# Patient Record
Sex: MALE | Race: WHITE
[De-identification: names, ages, dates, MRNs, and addresses within clinical notes are randomized per-mention and may not be internally consistent; named-entity substitution may affect disease eponyms.]

---

## 2018-12-26 ENCOUNTER — HOSPITAL ENCOUNTER (OUTPATIENT)
Age: 68
End: 2018-12-26
Payer: MEDICARE

## 2018-12-26 DIAGNOSIS — M17.10: ICD-10-CM

## 2018-12-26 DIAGNOSIS — Z01.818: ICD-10-CM

## 2018-12-26 DIAGNOSIS — Z79.01: ICD-10-CM

## 2018-12-26 DIAGNOSIS — T14.8XXA: ICD-10-CM

## 2018-12-26 DIAGNOSIS — R73.9: ICD-10-CM

## 2018-12-26 LAB
ANION GAP SERPL CALC-SCNC: 16.6 MEQ/L (ref 5–15)
APTT PPP: 28.9 SECONDS (ref 23.6–34)
BUN SERPL-MCNC: 20 MG/DL (ref 7–18)
CALCIUM SPEC-MCNC: 8.8 MG/DL (ref 8.5–10.1)
CHLORIDE SPEC-SCNC: 101 MMOL/L (ref 98–107)
CO2 SERPL-SCNC: 25 MMOL/L (ref 21–32)
COLOR UR: YELLOW
CREAT BLD-SCNC: 1.29 MG/DL (ref 0.7–1.3)
ESTIMATED GLOMERULAR FILT RATE: 56 ML/MIN (ref 60–?)
GFR (AFRICAN AMERICAN): 67 ML/MIN (ref 60–?)
GLUCOSE: 114 MG/DL (ref 74–106)
HBA1C MFR BLD: 6.2 % (ref 0–7)
HCT VFR BLD CALC: 46.6 % (ref 42–52)
HGB BLD-MCNC: 15.2 G/DL (ref 14.1–18)
INR PPP: 0.99 (ref 0.9–1.1)
KETONES UR STRIP.AUTO-MCNC: (no result) MG/DL
MCHC RBC-ENTMCNC: 32.7 G/DL (ref 31.8–35.4)
MCV RBC: 90.4 FL (ref 80–94)
MEAN CORPUSCULAR HEMOGLOBIN: 29.6 PG (ref 27–31.2)
MICRO URNS: (no result)
PH UR: 6 [PH] (ref 5–8.5)
PLATELET # BLD: 282 K/MM3 (ref 142–424)
POTASSIUM: 4.6 MMOL/L (ref 3.5–5.1)
PROT UR STRIP-MCNC: (no result) MG/DL
PT BLD: 10.2 SECONDS (ref 9.4–11.8)
RBC # BLD AUTO: 5.15 M/MM3 (ref 4.6–6.2)
SODIUM SPEC-SCNC: 138 MMOL/L (ref 136–145)
SP GR UR: 1.02 (ref 1–1.03)
UROBILINOGEN UR QL: 0.2 EU/DL
WBC # BLD AUTO: 12.5 K/MM3 (ref 4.8–10.8)

## 2018-12-26 PROCEDURE — 85610 PROTHROMBIN TIME: CPT

## 2018-12-26 PROCEDURE — 36415 COLL VENOUS BLD VENIPUNCTURE: CPT

## 2018-12-26 PROCEDURE — 83036 HEMOGLOBIN GLYCOSYLATED A1C: CPT

## 2018-12-26 PROCEDURE — 80048 BASIC METABOLIC PNL TOTAL CA: CPT

## 2018-12-26 PROCEDURE — 71046 X-RAY EXAM CHEST 2 VIEWS: CPT

## 2018-12-26 PROCEDURE — 85730 THROMBOPLASTIN TIME PARTIAL: CPT

## 2018-12-26 PROCEDURE — 81001 URINALYSIS AUTO W/SCOPE: CPT

## 2018-12-26 PROCEDURE — 85025 COMPLETE CBC W/AUTO DIFF WBC: CPT

## 2019-10-16 ENCOUNTER — HOSPITAL ENCOUNTER (OUTPATIENT)
Age: 69
End: 2019-10-16
Payer: MEDICARE

## 2019-10-16 DIAGNOSIS — R73.9: ICD-10-CM

## 2019-10-16 DIAGNOSIS — Z12.5: ICD-10-CM

## 2019-10-16 DIAGNOSIS — I10: Primary | ICD-10-CM

## 2019-10-16 LAB
ALBUMIN LEVEL: 3.9 GM/DL (ref 3.4–5)
ALBUMIN/GLOB SERPL: 1.1 {RATIO} (ref 1.1–1.8)
ALP ISO SERPL-ACNC: 110 U/L (ref 46–116)
ALT SERPLBLD-CCNC: 24 U/L (ref 12–78)
ANION GAP SERPL CALC-SCNC: 16.3 MEQ/L (ref 5–15)
AST SERPL QL: 15 U/L (ref 15–37)
BILIRUBIN,TOTAL: 0.5 MG/DL (ref 0.2–1)
BUN SERPL-MCNC: 23 MG/DL (ref 7–18)
CALCIUM SPEC-MCNC: 8.8 MG/DL (ref 8.5–10.1)
CHLORIDE SPEC-SCNC: 100 MMOL/L (ref 98–107)
CHOLEST SPEC-SCNC: 161 MG/DL (ref 140–200)
CO2 SERPL-SCNC: 24 MMOL/L (ref 21–32)
CREAT BLD-SCNC: 1.01 MG/DL (ref 0.7–1.3)
ESTIMATED GLOMERULAR FILT RATE: 73 ML/MIN (ref 60–?)
GFR (AFRICAN AMERICAN): 89 ML/MIN (ref 60–?)
GLOBULIN SER CALC-MCNC: 3.5 GM/DL (ref 1.3–3.2)
GLUCOSE: 105 MG/DL (ref 74–106)
HCT VFR BLD CALC: 46.1 % (ref 42–52)
HDLC SERPL-MCNC: 32 MG/DL (ref 27–67)
HGB BLD-MCNC: 14.2 G/DL (ref 14.1–18)
MCHC RBC-ENTMCNC: 30.8 G/DL (ref 31.8–35.4)
MCV RBC: 94.2 FL (ref 80–94)
MEAN CORPUSCULAR HEMOGLOBIN: 29 PG (ref 27–31.2)
PLATELET # BLD: 324 K/MM3 (ref 142–424)
POTASSIUM: 4.3 MMOL/L (ref 3.5–5.1)
PROT SERPL-MCNC: 7.4 GM/DL (ref 6.4–8.2)
RBC # BLD AUTO: 4.89 M/MM3 (ref 4.6–6.2)
SODIUM SPEC-SCNC: 136 MMOL/L (ref 136–145)
T4 (THYROXINE): 8 UG/DL (ref 4.7–13.3)
TRIGLYCERIDES: 161 MG/DL (ref 30–200)
TSH SERPL-ACNC: 1.98 UIU/ML (ref 0.36–3.74)
WBC # BLD AUTO: 8.5 K/MM3 (ref 4.8–10.8)

## 2019-10-16 PROCEDURE — 84443 ASSAY THYROID STIM HORMONE: CPT

## 2019-10-16 PROCEDURE — 84436 ASSAY OF TOTAL THYROXINE: CPT

## 2019-10-16 PROCEDURE — 80053 COMPREHEN METABOLIC PANEL: CPT

## 2019-10-16 PROCEDURE — 85025 COMPLETE CBC W/AUTO DIFF WBC: CPT

## 2019-10-16 PROCEDURE — 80061 LIPID PANEL: CPT

## 2019-10-16 PROCEDURE — G0103 PSA SCREENING: HCPCS

## 2020-11-25 ENCOUNTER — HOSPITAL ENCOUNTER (OUTPATIENT)
Age: 70
End: 2020-11-25
Payer: MEDICARE

## 2020-11-25 DIAGNOSIS — R73.9: Primary | ICD-10-CM

## 2020-11-25 DIAGNOSIS — Z12.5: ICD-10-CM

## 2020-11-25 DIAGNOSIS — I50.9: ICD-10-CM

## 2020-11-25 LAB
25-OH VITAMIN D, TOTAL: 35.9 NG/ML (ref 30–100)
ALBUMIN LEVEL: 4.2 G/DL (ref 3.5–5)
ALBUMIN/GLOB SERPL: 1.4 {RATIO} (ref 1.1–1.8)
ALP ISO SERPL-ACNC: 101 U/L (ref 38–126)
ALT SERPLBLD-CCNC: 25 U/L (ref 12–78)
ANION GAP SERPL CALC-SCNC: 15.6 MEQ/L (ref 5–15)
AST SERPL QL: 27 U/L (ref 17–59)
BILIRUBIN,TOTAL: 0.7 MG/DL (ref 0.2–1.3)
BUN SERPL-MCNC: 21 MG/DL (ref 9–20)
CALCIUM SPEC-MCNC: 9.2 MG/DL (ref 8.4–10.2)
CHLORIDE SPEC-SCNC: 100 MMOL/L (ref 98–107)
CHOLEST SPEC-SCNC: 174 MG/DL (ref 140–200)
CO2 SERPL-SCNC: 24 MMOL/L (ref 22–30)
CREAT BLD-SCNC: 1 MG/DL (ref 0.66–1.25)
ESTIMATED GLOMERULAR FILT RATE: 74 ML/MIN (ref 60–?)
GFR (AFRICAN AMERICAN): 90 ML/MIN (ref 60–?)
GLOBULIN SER CALC-MCNC: 2.9 G/DL (ref 1.3–3.2)
GLUCOSE: 157 MG/DL (ref 74–100)
HBA1C MFR BLD: 6.3 % (ref 4–6)
HCT VFR BLD CALC: 45 % (ref 42–52)
HDLC SERPL-MCNC: 28 MG/DL (ref 40–60)
HGB BLD-MCNC: 15.1 G/DL (ref 14.1–18)
MCHC RBC-ENTMCNC: 33.7 G/DL (ref 31.8–35.4)
MCV RBC: 93.7 FL (ref 80–94)
MEAN CORPUSCULAR HEMOGLOBIN: 31.5 PG (ref 27–31.2)
PLATELET # BLD: 274 K/MM3 (ref 142–424)
POTASSIUM: 4.6 MMOL/L (ref 3.5–5.1)
PROT SERPL-MCNC: 7.1 G/DL (ref 6.3–8.2)
RBC # BLD AUTO: 4.8 M/MM3 (ref 4.6–6.2)
SODIUM SPEC-SCNC: 135 MMOL/L (ref 136–145)
T4 FREE SERPL-MCNC: 1.26 NG/DL (ref 0.78–2.19)
TRIGLYCERIDES: 226 MG/DL (ref 30–150)
TSH SERPL-ACNC: 2.39 UIU/ML (ref 0.47–4.68)
WBC # BLD AUTO: 9.3 K/MM3 (ref 4.8–10.8)

## 2020-11-25 PROCEDURE — 82306 VITAMIN D 25 HYDROXY: CPT

## 2020-11-25 PROCEDURE — 84439 ASSAY OF FREE THYROXINE: CPT

## 2020-11-25 PROCEDURE — 80053 COMPREHEN METABOLIC PANEL: CPT

## 2020-11-25 PROCEDURE — 85025 COMPLETE CBC W/AUTO DIFF WBC: CPT

## 2020-11-25 PROCEDURE — 83036 HEMOGLOBIN GLYCOSYLATED A1C: CPT

## 2020-11-25 PROCEDURE — 84443 ASSAY THYROID STIM HORMONE: CPT

## 2020-11-25 PROCEDURE — G0103 PSA SCREENING: HCPCS

## 2020-11-25 PROCEDURE — 80061 LIPID PANEL: CPT

## 2020-12-06 ENCOUNTER — APPOINTMENT (OUTPATIENT)
Dept: PREADMISSION TESTING | Facility: HOSPITAL | Age: 70
End: 2020-12-06

## 2021-01-07 ENCOUNTER — HOSPITAL ENCOUNTER (OUTPATIENT)
Dept: GENERAL RADIOLOGY | Facility: HOSPITAL | Age: 71
Discharge: HOME | End: 2021-01-07

## 2021-01-07 ENCOUNTER — ANESTHESIA EVENT (OUTPATIENT)
Dept: PERIOP | Facility: HOSPITAL | Age: 71
End: 2021-01-07

## 2021-01-07 ENCOUNTER — APPOINTMENT (OUTPATIENT)
Dept: PREADMISSION TESTING | Facility: HOSPITAL | Age: 71
End: 2021-01-07

## 2021-01-07 VITALS — HEIGHT: 70 IN | BODY MASS INDEX: 43.67 KG/M2 | WEIGHT: 305 LBS

## 2021-01-07 LAB
ALBUMIN SERPL-MCNC: 4.4 G/DL (ref 3.5–5.2)
ALBUMIN/GLOB SERPL: 1.5 G/DL
ALP SERPL-CCNC: 105 U/L (ref 39–117)
ALT SERPL W P-5'-P-CCNC: 25 U/L (ref 1–41)
ANION GAP SERPL CALCULATED.3IONS-SCNC: 13 MMOL/L (ref 5–15)
APTT PPP: 33.7 SECONDS (ref 24–37)
AST SERPL-CCNC: 21 U/L (ref 1–40)
BASOPHILS # BLD AUTO: 0.05 10*3/MM3 (ref 0–0.2)
BASOPHILS NFR BLD AUTO: 0.4 % (ref 0–1.5)
BILIRUB SERPL-MCNC: 0.3 MG/DL (ref 0–1.2)
BUN SERPL-MCNC: 21 MG/DL (ref 8–23)
BUN/CREAT SERPL: 22.6 (ref 7–25)
CALCIUM SPEC-SCNC: 9.7 MG/DL (ref 8.6–10.5)
CHLORIDE SERPL-SCNC: 98 MMOL/L (ref 98–107)
CO2 SERPL-SCNC: 24 MMOL/L (ref 22–29)
CREAT SERPL-MCNC: 0.93 MG/DL (ref 0.76–1.27)
DEPRECATED RDW RBC AUTO: 48.5 FL (ref 37–54)
EOSINOPHIL # BLD AUTO: 0.14 10*3/MM3 (ref 0–0.4)
EOSINOPHIL NFR BLD AUTO: 1.3 % (ref 0.3–6.2)
ERYTHROCYTE [DISTWIDTH] IN BLOOD BY AUTOMATED COUNT: 14.3 % (ref 12.3–15.4)
FLUAV RNA RESP QL NAA+PROBE: NOT DETECTED
FLUBV RNA RESP QL NAA+PROBE: NOT DETECTED
GFR SERPL CREATININE-BSD FRML MDRD: 81 ML/MIN/1.73
GFR SERPL CREATININE-BSD FRML MDRD: 98 ML/MIN/1.73
GLOBULIN UR ELPH-MCNC: 2.9 GM/DL
GLUCOSE SERPL-MCNC: 82 MG/DL (ref 65–99)
HBA1C MFR BLD: 6.4 % (ref 4.8–5.6)
HCT VFR BLD AUTO: 44.6 % (ref 37.5–51)
HGB BLD-MCNC: 14.3 G/DL (ref 13–17.7)
IMM GRANULOCYTES # BLD AUTO: 0.03 10*3/MM3 (ref 0–0.05)
IMM GRANULOCYTES NFR BLD AUTO: 0.3 % (ref 0–0.5)
INR PPP: 0.97 (ref 0.85–1.16)
LYMPHOCYTES # BLD AUTO: 2.8 10*3/MM3 (ref 0.7–3.1)
LYMPHOCYTES NFR BLD AUTO: 25.1 % (ref 19.6–45.3)
MCH RBC QN AUTO: 29.4 PG (ref 26.6–33)
MCHC RBC AUTO-ENTMCNC: 32.1 G/DL (ref 31.5–35.7)
MCV RBC AUTO: 91.8 FL (ref 79–97)
MONOCYTES # BLD AUTO: 1.06 10*3/MM3 (ref 0.1–0.9)
MONOCYTES NFR BLD AUTO: 9.5 % (ref 5–12)
NEUTROPHILS NFR BLD AUTO: 63.4 % (ref 42.7–76)
NEUTROPHILS NFR BLD AUTO: 7.07 10*3/MM3 (ref 1.7–7)
NRBC BLD AUTO-RTO: 0 /100 WBC (ref 0–0.2)
PLATELET # BLD AUTO: 293 10*3/MM3 (ref 140–450)
PMV BLD AUTO: 10.6 FL (ref 6–12)
POTASSIUM SERPL-SCNC: 4.4 MMOL/L (ref 3.5–5.2)
PROT SERPL-MCNC: 7.3 G/DL (ref 6–8.5)
PROTHROMBIN TIME: 12.6 SECONDS (ref 11.5–14)
RBC # BLD AUTO: 4.86 10*6/MM3 (ref 4.14–5.8)
SARS-COV-2 RNA RESP QL NAA+PROBE: NOT DETECTED
SODIUM SERPL-SCNC: 135 MMOL/L (ref 136–145)
WBC # BLD AUTO: 11.15 10*3/MM3 (ref 3.4–10.8)

## 2021-01-07 PROCEDURE — 85730 THROMBOPLASTIN TIME PARTIAL: CPT

## 2021-01-07 PROCEDURE — 36415 COLL VENOUS BLD VENIPUNCTURE: CPT

## 2021-01-07 PROCEDURE — 83036 HEMOGLOBIN GLYCOSYLATED A1C: CPT

## 2021-01-07 PROCEDURE — 87636 SARSCOV2 & INF A&B AMP PRB: CPT

## 2021-01-07 PROCEDURE — C9803 HOPD COVID-19 SPEC COLLECT: HCPCS

## 2021-01-07 PROCEDURE — 80053 COMPREHEN METABOLIC PANEL: CPT

## 2021-01-07 PROCEDURE — 85610 PROTHROMBIN TIME: CPT

## 2021-01-07 PROCEDURE — 71046 X-RAY EXAM CHEST 2 VIEWS: CPT

## 2021-01-07 PROCEDURE — 85025 COMPLETE CBC W/AUTO DIFF WBC: CPT

## 2021-01-07 RX ORDER — ATORVASTATIN CALCIUM 20 MG/1
20 TABLET, FILM COATED ORAL NIGHTLY
COMMUNITY

## 2021-01-07 RX ORDER — METOPROLOL SUCCINATE 50 MG/1
50 TABLET, EXTENDED RELEASE ORAL NIGHTLY
COMMUNITY

## 2021-01-07 RX ORDER — SPIRONOLACTONE 25 MG/1
25 TABLET ORAL DAILY
COMMUNITY

## 2021-01-07 RX ORDER — NITROGLYCERIN 0.4 MG/1
0.4 TABLET SUBLINGUAL
COMMUNITY

## 2021-01-07 RX ORDER — BUMETANIDE 2 MG/1
2 TABLET ORAL EVERY MORNING
COMMUNITY

## 2021-01-07 RX ORDER — SACUBITRIL AND VALSARTAN 97; 103 MG/1; MG/1
1 TABLET, FILM COATED ORAL 2 TIMES DAILY
COMMUNITY

## 2021-01-07 RX ORDER — DIPHENHYDRAMINE HCL 50 MG
50 CAPSULE ORAL NIGHTLY PRN
COMMUNITY

## 2021-01-07 RX ORDER — FAMOTIDINE 10 MG/ML
20 INJECTION, SOLUTION INTRAVENOUS ONCE
Status: CANCELLED | OUTPATIENT
Start: 2021-01-07 | End: 2021-01-07

## 2021-01-07 NOTE — PAT
Patient instructed to drink 20 ounces (or until full) of Gatorade and it needs to be completed 1 hour before given arrival time for procedure (NO RED Gatorade)    Patient verbalized understanding.    Patient given a prescription for Benzol Peroxide 5% wash during PAT visit.  Instructed them to fill the prescription or  from [facility] pharmacy if was submitted electronically by the surgeon's office.  Verbal and written instructions given regarding proper use of the Benzoyl Peroxide wash given to patient and/or famlily during PAT visit. Patient/family also instructed to complete checklist and return it to Pre-op on the day of surgery.  Patient and/or family verbalized understanding.      Additionally, reinforced with patient to acquire this prescription from the [facility] retail pharmacy before leaving the hospital after PAT visit due to the potential unavailability at local pharmacies.    Clean catch urinalysis not indicated because patient denied recent urinary frequency, urinary urgency, burning or pain upon urination, or flank pain. No recent UTIs.    covid in [facility]    EKG 12/3/20  Clearance from Dr Onofre on chart     Forgot to obtain UA in PAT.  Patient is asymptomatic.

## 2021-01-08 ENCOUNTER — HOSPITAL ENCOUNTER (OUTPATIENT)
Facility: HOSPITAL | Age: 71
LOS: 1 days | Discharge: HOME | End: 2021-01-09
Attending: ORTHOPAEDIC SURGERY | Admitting: ORTHOPAEDIC SURGERY

## 2021-01-08 ENCOUNTER — ANESTHESIA (OUTPATIENT)
Dept: PERIOP | Facility: HOSPITAL | Age: 71
End: 2021-01-08

## 2021-01-08 ENCOUNTER — APPOINTMENT (OUTPATIENT)
Dept: GENERAL RADIOLOGY | Facility: HOSPITAL | Age: 71
End: 2021-01-08

## 2021-01-08 ENCOUNTER — ANESTHESIA EVENT CONVERTED (OUTPATIENT)
Dept: ANESTHESIOLOGY | Facility: HOSPITAL | Age: 71
End: 2021-01-08

## 2021-01-08 DIAGNOSIS — Z96.611 STATUS POST REVERSE TOTAL ARTHROPLASTY OF RIGHT SHOULDER: Primary | ICD-10-CM

## 2021-01-08 PROBLEM — E78.5 HYPERLIPIDEMIA: Status: ACTIVE | Noted: 2021-01-08

## 2021-01-08 PROBLEM — I10 HTN (HYPERTENSION): Status: ACTIVE | Noted: 2021-01-08

## 2021-01-08 PROBLEM — I25.10 CAD (CORONARY ARTERY DISEASE): Status: ACTIVE | Noted: 2021-01-08

## 2021-01-08 PROBLEM — B33.24 VIRAL CARDIOMYOPATHY (HCC): Status: ACTIVE | Noted: 2021-01-08

## 2021-01-08 PROBLEM — M19.011 GLENOHUMERAL ARTHRITIS, RIGHT: Status: ACTIVE | Noted: 2021-01-08

## 2021-01-08 LAB
BILIRUB UR QL STRIP: NEGATIVE
CLARITY UR: CLEAR
COLOR UR: YELLOW
GLUCOSE UR STRIP-MCNC: NEGATIVE MG/DL
HGB UR QL STRIP.AUTO: NEGATIVE
KETONES UR QL STRIP: NEGATIVE
LEUKOCYTE ESTERASE UR QL STRIP.AUTO: NEGATIVE
NITRITE UR QL STRIP: NEGATIVE
PH UR STRIP.AUTO: 6 [PH] (ref 5–8)
PROT UR QL STRIP: ABNORMAL
SP GR UR STRIP: 1.02 (ref 1–1.03)
UROBILINOGEN UR QL STRIP: ABNORMAL

## 2021-01-08 PROCEDURE — 25010000002 VANCOMYCIN 1 G RECONSTITUTED SOLUTION: Performed by: ORTHOPAEDIC SURGERY

## 2021-01-08 PROCEDURE — 25010000002 DEXAMETHASONE PER 1 MG: Performed by: NURSE ANESTHETIST, CERTIFIED REGISTERED

## 2021-01-08 PROCEDURE — 25010000002 PROPOFOL 10 MG/ML EMULSION: Performed by: NURSE ANESTHETIST, CERTIFIED REGISTERED

## 2021-01-08 PROCEDURE — A9270 NON-COVERED ITEM OR SERVICE: HCPCS | Performed by: ORTHOPAEDIC SURGERY

## 2021-01-08 PROCEDURE — 73020 X-RAY EXAM OF SHOULDER: CPT

## 2021-01-08 PROCEDURE — 25010000002 NEOSTIGMINE 10 MG/10ML SOLUTION: Performed by: NURSE ANESTHETIST, CERTIFIED REGISTERED

## 2021-01-08 PROCEDURE — 94799 UNLISTED PULMONARY SVC/PX: CPT

## 2021-01-08 PROCEDURE — 81003 URINALYSIS AUTO W/O SCOPE: CPT | Performed by: ORTHOPAEDIC SURGERY

## 2021-01-08 PROCEDURE — C1776 JOINT DEVICE (IMPLANTABLE): HCPCS | Performed by: ORTHOPAEDIC SURGERY

## 2021-01-08 PROCEDURE — 25010000002 ROPIVACAINE PER 1 MG: Performed by: NURSE ANESTHETIST, CERTIFIED REGISTERED

## 2021-01-08 PROCEDURE — 25010000002 CEFAZOLIN PER 500 MG: Performed by: ORTHOPAEDIC SURGERY

## 2021-01-08 PROCEDURE — 25010000003 CEFAZOLIN IN DEXTROSE 2-4 GM/100ML-% SOLUTION: Performed by: ORTHOPAEDIC SURGERY

## 2021-01-08 PROCEDURE — 25010000002 FENTANYL CITRATE (PF) 100 MCG/2ML SOLUTION: Performed by: NURSE ANESTHETIST, CERTIFIED REGISTERED

## 2021-01-08 PROCEDURE — 25010000002 ONDANSETRON PER 1 MG: Performed by: NURSE ANESTHETIST, CERTIFIED REGISTERED

## 2021-01-08 PROCEDURE — A9270 NON-COVERED ITEM OR SERVICE: HCPCS | Performed by: INTERNAL MEDICINE

## 2021-01-08 PROCEDURE — 63710000001 ATORVASTATIN 20 MG TABLET: Performed by: INTERNAL MEDICINE

## 2021-01-08 PROCEDURE — 63710000001 POVIDONE-IODINE 10 % SOLUTION 30 ML BOTTLE: Performed by: ORTHOPAEDIC SURGERY

## 2021-01-08 PROCEDURE — 25010000002 PHENYLEPHRINE PER 1 ML: Performed by: NURSE ANESTHETIST, CERTIFIED REGISTERED

## 2021-01-08 PROCEDURE — 25010000003 LIDOCAINE 1 % SOLUTION: Performed by: NURSE ANESTHETIST, CERTIFIED REGISTERED

## 2021-01-08 PROCEDURE — L3670 SO ACRO/CLAV CAN WEB PRE OTS: HCPCS | Performed by: ORTHOPAEDIC SURGERY

## 2021-01-08 DEVICE — 20MM CENTRAL SCREW, MODULAR
Type: IMPLANTABLE DEVICE | Site: SHOULDER | Status: FUNCTIONAL
Brand: ARTHREX®

## 2021-01-08 DEVICE — SCRW LK GLEN UNIVERS REVERS PERIPH 5.5X36MM: Type: IMPLANTABLE DEVICE | Site: SHOULDER | Status: FUNCTIONAL

## 2021-01-08 DEVICE — UNIVERS REVERS SUTURE CUP, 42 (NEUTRAL)
Type: IMPLANTABLE DEVICE | Site: SHOULDER | Status: FUNCTIONAL
Brand: ARTHREX®

## 2021-01-08 DEVICE — SUT FW #2 W/TPR NDL 1/2 CIR 38IN 97CM 26.5MM BLU: Type: IMPLANTABLE DEVICE | Site: SHOULDER | Status: FUNCTIONAL

## 2021-01-08 DEVICE — 4.5X32MM PERIPHERAL SCREW, NON-LOCKING
Type: IMPLANTABLE DEVICE | Site: SHOULDER | Status: FUNCTIONAL
Brand: ARTHREX®

## 2021-01-08 DEVICE — 4.5X40MM PERIPHERAL SCREW, NON-LOCKING
Type: IMPLANTABLE DEVICE | Site: SHOULDER | Status: FUNCTIONAL
Brand: ARTHREX®

## 2021-01-08 DEVICE — UNIVERS REVERS APEX STEM, SIZE 12
Type: IMPLANTABLE DEVICE | Site: SHOULDER | Status: FUNCTIONAL
Brand: ARTHREX®

## 2021-01-08 DEVICE — IMPLANTABLE DEVICE: Type: IMPLANTABLE DEVICE | Site: SHOULDER | Status: FUNCTIONAL

## 2021-01-08 DEVICE — 42 +4 LAT/28 GLENOSPHERE
Type: IMPLANTABLE DEVICE | Site: SHOULDER | Status: FUNCTIONAL
Brand: ARTHREX®

## 2021-01-08 DEVICE — 5.5X16MM PERIPHERAL SCREW, LOCKING
Type: IMPLANTABLE DEVICE | Site: SHOULDER | Status: FUNCTIONAL
Brand: ARTHREX®

## 2021-01-08 DEVICE — HUM INSERT L/42+6 TO FIT IN 42 CUP CONST
Type: IMPLANTABLE DEVICE | Site: SHOULDER | Status: FUNCTIONAL
Brand: ARTHREX®

## 2021-01-08 DEVICE — 28MM +2 LAT BASEPLATE, MODULAR
Type: IMPLANTABLE DEVICE | Site: SHOULDER | Status: FUNCTIONAL
Brand: ARTHREX®

## 2021-01-08 DEVICE — ABSORBABLE HEMOSTAT (OXIDIZED REGENERATED CELLULOSE, U.S.P.)
Type: IMPLANTABLE DEVICE | Site: SHOULDER | Status: FUNCTIONAL
Brand: SURGICEL

## 2021-01-08 RX ORDER — ONDANSETRON 2 MG/ML
4 INJECTION INTRAMUSCULAR; INTRAVENOUS EVERY 6 HOURS PRN
Status: DISCONTINUED | OUTPATIENT
Start: 2021-01-08 | End: 2021-01-09 | Stop reason: HOSPADM

## 2021-01-08 RX ORDER — GLYCOPYRROLATE 0.2 MG/ML
INJECTION INTRAMUSCULAR; INTRAVENOUS AS NEEDED
Status: DISCONTINUED | OUTPATIENT
Start: 2021-01-08 | End: 2021-01-08 | Stop reason: SURG

## 2021-01-08 RX ORDER — OXYCODONE HYDROCHLORIDE 5 MG/1
5 TABLET ORAL EVERY 4 HOURS PRN
Status: DISCONTINUED | OUTPATIENT
Start: 2021-01-08 | End: 2021-01-09 | Stop reason: HOSPADM

## 2021-01-08 RX ORDER — ACETAMINOPHEN 650 MG
TABLET, EXTENDED RELEASE ORAL AS NEEDED
Status: DISCONTINUED | OUTPATIENT
Start: 2021-01-08 | End: 2021-01-08 | Stop reason: HOSPADM

## 2021-01-08 RX ORDER — DEXAMETHASONE SODIUM PHOSPHATE 4 MG/ML
INJECTION, SOLUTION INTRA-ARTICULAR; INTRALESIONAL; INTRAMUSCULAR; INTRAVENOUS; SOFT TISSUE AS NEEDED
Status: DISCONTINUED | OUTPATIENT
Start: 2021-01-08 | End: 2021-01-08 | Stop reason: SURG

## 2021-01-08 RX ORDER — LIDOCAINE HYDROCHLORIDE 10 MG/ML
0.5 INJECTION, SOLUTION EPIDURAL; INFILTRATION; INTRACAUDAL; PERINEURAL ONCE AS NEEDED
Status: COMPLETED | OUTPATIENT
Start: 2021-01-08 | End: 2021-01-08

## 2021-01-08 RX ORDER — PREGABALIN 75 MG/1
75 CAPSULE ORAL ONCE
Status: COMPLETED | OUTPATIENT
Start: 2021-01-08 | End: 2021-01-08

## 2021-01-08 RX ORDER — LABETALOL HYDROCHLORIDE 5 MG/ML
10 INJECTION, SOLUTION INTRAVENOUS EVERY 4 HOURS PRN
Status: DISCONTINUED | OUTPATIENT
Start: 2021-01-08 | End: 2021-01-09 | Stop reason: HOSPADM

## 2021-01-08 RX ORDER — ATORVASTATIN CALCIUM 20 MG/1
20 TABLET, FILM COATED ORAL NIGHTLY
Status: DISCONTINUED | OUTPATIENT
Start: 2021-01-08 | End: 2021-01-09 | Stop reason: HOSPADM

## 2021-01-08 RX ORDER — LIDOCAINE HYDROCHLORIDE 10 MG/ML
INJECTION, SOLUTION INFILTRATION; PERINEURAL AS NEEDED
Status: DISCONTINUED | OUTPATIENT
Start: 2021-01-08 | End: 2021-01-08 | Stop reason: SURG

## 2021-01-08 RX ORDER — CEFAZOLIN SODIUM 2 G/100ML
2 INJECTION, SOLUTION INTRAVENOUS ONCE
Status: COMPLETED | OUTPATIENT
Start: 2021-01-08 | End: 2021-01-08

## 2021-01-08 RX ORDER — ONDANSETRON 2 MG/ML
4 INJECTION INTRAMUSCULAR; INTRAVENOUS ONCE AS NEEDED
Status: DISCONTINUED | OUTPATIENT
Start: 2021-01-08 | End: 2021-01-08 | Stop reason: HOSPADM

## 2021-01-08 RX ORDER — VANCOMYCIN HYDROCHLORIDE 1 G/20ML
INJECTION, POWDER, LYOPHILIZED, FOR SOLUTION INTRAVENOUS AS NEEDED
Status: DISCONTINUED | OUTPATIENT
Start: 2021-01-08 | End: 2021-01-08 | Stop reason: HOSPADM

## 2021-01-08 RX ORDER — FENTANYL CITRATE 50 UG/ML
50 INJECTION, SOLUTION INTRAMUSCULAR; INTRAVENOUS
Status: DISCONTINUED | OUTPATIENT
Start: 2021-01-08 | End: 2021-01-08 | Stop reason: HOSPADM

## 2021-01-08 RX ORDER — HYDROMORPHONE HYDROCHLORIDE 1 MG/ML
0.5 INJECTION, SOLUTION INTRAMUSCULAR; INTRAVENOUS; SUBCUTANEOUS
Status: DISCONTINUED | OUTPATIENT
Start: 2021-01-08 | End: 2021-01-09 | Stop reason: HOSPADM

## 2021-01-08 RX ORDER — CEFAZOLIN SODIUM IN 0.9 % NACL 3 G/100 ML
3 INTRAVENOUS SOLUTION, PIGGYBACK (ML) INTRAVENOUS EVERY 8 HOURS
Status: COMPLETED | OUTPATIENT
Start: 2021-01-08 | End: 2021-01-09

## 2021-01-08 RX ORDER — MAGNESIUM HYDROXIDE 1200 MG/15ML
LIQUID ORAL AS NEEDED
Status: DISCONTINUED | OUTPATIENT
Start: 2021-01-08 | End: 2021-01-08 | Stop reason: HOSPADM

## 2021-01-08 RX ORDER — FENTANYL CITRATE 50 UG/ML
INJECTION, SOLUTION INTRAMUSCULAR; INTRAVENOUS
Status: COMPLETED | OUTPATIENT
Start: 2021-01-08 | End: 2021-01-08

## 2021-01-08 RX ORDER — SODIUM CHLORIDE 450 MG/100ML
50 INJECTION, SOLUTION INTRAVENOUS CONTINUOUS
Status: DISCONTINUED | OUTPATIENT
Start: 2021-01-08 | End: 2021-01-09 | Stop reason: HOSPADM

## 2021-01-08 RX ORDER — METOPROLOL SUCCINATE 50 MG/1
50 TABLET, EXTENDED RELEASE ORAL NIGHTLY
Status: DISCONTINUED | OUTPATIENT
Start: 2021-01-08 | End: 2021-01-09 | Stop reason: HOSPADM

## 2021-01-08 RX ORDER — ACETAMINOPHEN 325 MG/1
650 TABLET ORAL EVERY 4 HOURS PRN
Status: DISCONTINUED | OUTPATIENT
Start: 2021-01-08 | End: 2021-01-09 | Stop reason: HOSPADM

## 2021-01-08 RX ORDER — SODIUM CHLORIDE, SODIUM LACTATE, POTASSIUM CHLORIDE, CALCIUM CHLORIDE 600; 310; 30; 20 MG/100ML; MG/100ML; MG/100ML; MG/100ML
9 INJECTION, SOLUTION INTRAVENOUS CONTINUOUS
Status: DISCONTINUED | OUTPATIENT
Start: 2021-01-08 | End: 2021-01-08

## 2021-01-08 RX ORDER — BUPIVACAINE HYDROCHLORIDE 2.5 MG/ML
INJECTION, SOLUTION EPIDURAL; INFILTRATION; INTRACAUDAL
Status: COMPLETED | OUTPATIENT
Start: 2021-01-08 | End: 2021-01-08

## 2021-01-08 RX ORDER — ACETAMINOPHEN 500 MG
1000 TABLET ORAL ONCE
Status: COMPLETED | OUTPATIENT
Start: 2021-01-08 | End: 2021-01-08

## 2021-01-08 RX ORDER — SODIUM CHLORIDE 0.9 % (FLUSH) 0.9 %
10 SYRINGE (ML) INJECTION AS NEEDED
Status: DISCONTINUED | OUTPATIENT
Start: 2021-01-08 | End: 2021-01-08 | Stop reason: HOSPADM

## 2021-01-08 RX ORDER — ROCURONIUM BROMIDE 10 MG/ML
INJECTION, SOLUTION INTRAVENOUS AS NEEDED
Status: DISCONTINUED | OUTPATIENT
Start: 2021-01-08 | End: 2021-01-08 | Stop reason: SURG

## 2021-01-08 RX ORDER — SODIUM CHLORIDE 0.9 % (FLUSH) 0.9 %
10 SYRINGE (ML) INJECTION EVERY 12 HOURS SCHEDULED
Status: DISCONTINUED | OUTPATIENT
Start: 2021-01-08 | End: 2021-01-08 | Stop reason: HOSPADM

## 2021-01-08 RX ORDER — BUMETANIDE 2 MG/1
2 TABLET ORAL EVERY MORNING
Status: DISCONTINUED | OUTPATIENT
Start: 2021-01-09 | End: 2021-01-09

## 2021-01-08 RX ORDER — ESMOLOL HYDROCHLORIDE 10 MG/ML
INJECTION INTRAVENOUS AS NEEDED
Status: DISCONTINUED | OUTPATIENT
Start: 2021-01-08 | End: 2021-01-08 | Stop reason: SURG

## 2021-01-08 RX ORDER — NEOSTIGMINE METHYLSULFATE 1 MG/ML
INJECTION, SOLUTION INTRAVENOUS AS NEEDED
Status: DISCONTINUED | OUTPATIENT
Start: 2021-01-08 | End: 2021-01-08 | Stop reason: SURG

## 2021-01-08 RX ORDER — PROPOFOL 10 MG/ML
VIAL (ML) INTRAVENOUS AS NEEDED
Status: DISCONTINUED | OUTPATIENT
Start: 2021-01-08 | End: 2021-01-08 | Stop reason: SURG

## 2021-01-08 RX ORDER — ACETAMINOPHEN 650 MG/1
650 SUPPOSITORY RECTAL EVERY 4 HOURS PRN
Status: DISCONTINUED | OUTPATIENT
Start: 2021-01-08 | End: 2021-01-09 | Stop reason: HOSPADM

## 2021-01-08 RX ORDER — FAMOTIDINE 20 MG/1
20 TABLET, FILM COATED ORAL ONCE
Status: COMPLETED | OUTPATIENT
Start: 2021-01-08 | End: 2021-01-08

## 2021-01-08 RX ORDER — SPIRONOLACTONE 25 MG/1
25 TABLET ORAL DAILY
Status: DISCONTINUED | OUTPATIENT
Start: 2021-01-09 | End: 2021-01-09 | Stop reason: HOSPADM

## 2021-01-08 RX ORDER — NALOXONE HCL 0.4 MG/ML
0.1 VIAL (ML) INJECTION
Status: DISCONTINUED | OUTPATIENT
Start: 2021-01-08 | End: 2021-01-09 | Stop reason: HOSPADM

## 2021-01-08 RX ORDER — NITROGLYCERIN 0.4 MG/1
0.4 TABLET SUBLINGUAL
Status: DISCONTINUED | OUTPATIENT
Start: 2021-01-08 | End: 2021-01-09 | Stop reason: HOSPADM

## 2021-01-08 RX ORDER — ONDANSETRON 2 MG/ML
INJECTION INTRAMUSCULAR; INTRAVENOUS AS NEEDED
Status: DISCONTINUED | OUTPATIENT
Start: 2021-01-08 | End: 2021-01-08 | Stop reason: SURG

## 2021-01-08 RX ORDER — ASPIRIN 81 MG/1
81 TABLET ORAL EVERY MORNING
Status: DISCONTINUED | OUTPATIENT
Start: 2021-01-09 | End: 2021-01-09 | Stop reason: HOSPADM

## 2021-01-08 RX ORDER — ONDANSETRON 4 MG/1
4 TABLET, FILM COATED ORAL EVERY 6 HOURS PRN
Status: DISCONTINUED | OUTPATIENT
Start: 2021-01-08 | End: 2021-01-09 | Stop reason: HOSPADM

## 2021-01-08 RX ADMIN — ATORVASTATIN CALCIUM 20 MG: 20 TABLET, FILM COATED ORAL at 22:10

## 2021-01-08 RX ADMIN — FENTANYL CITRATE 100 MCG: 50 INJECTION, SOLUTION INTRAMUSCULAR; INTRAVENOUS at 12:10

## 2021-01-08 RX ADMIN — FAMOTIDINE 20 MG: 20 TABLET, FILM COATED ORAL at 11:28

## 2021-01-08 RX ADMIN — LIDOCAINE HYDROCHLORIDE 50 MG: 10 INJECTION, SOLUTION INFILTRATION; PERINEURAL at 13:36

## 2021-01-08 RX ADMIN — ROPIVACAINE HYDROCHLORIDE 6 ML/HR: 5 INJECTION, SOLUTION EPIDURAL; INFILTRATION; PERINEURAL at 16:37

## 2021-01-08 RX ADMIN — Medication 1000 MG: at 15:12

## 2021-01-08 RX ADMIN — DEXAMETHASONE SODIUM PHOSPHATE 8 MG: 4 INJECTION, SOLUTION INTRAMUSCULAR; INTRAVENOUS at 13:48

## 2021-01-08 RX ADMIN — ROCURONIUM BROMIDE 50 MG: 10 INJECTION INTRAVENOUS at 13:36

## 2021-01-08 RX ADMIN — PROPOFOL 200 MG: 10 INJECTION, EMULSION INTRAVENOUS at 13:36

## 2021-01-08 RX ADMIN — PHENYLEPHRINE HYDROCHLORIDE 100 MCG: 10 INJECTION INTRAVENOUS at 14:42

## 2021-01-08 RX ADMIN — GLYCOPYRROLATE 0.4 MG: 0.4 INJECTION INTRAMUSCULAR; INTRAVENOUS at 16:14

## 2021-01-08 RX ADMIN — NEOSTIGMINE 4 MG: 1 INJECTION INTRAVENOUS at 16:14

## 2021-01-08 RX ADMIN — ONDANSETRON 4 MG: 2 INJECTION INTRAMUSCULAR; INTRAVENOUS at 13:48

## 2021-01-08 RX ADMIN — BUPIVACAINE HYDROCHLORIDE 15 ML: 2.5 INJECTION, SOLUTION EPIDURAL; INFILTRATION; INTRACAUDAL; PERINEURAL at 12:10

## 2021-01-08 RX ADMIN — ACETAMINOPHEN 1000 MG: 500 TABLET ORAL at 11:28

## 2021-01-08 RX ADMIN — ESMOLOL HYDROCHLORIDE 20 MG: 10 INJECTION, SOLUTION INTRAVENOUS at 13:31

## 2021-01-08 RX ADMIN — SODIUM CHLORIDE, POTASSIUM CHLORIDE, SODIUM LACTATE AND CALCIUM CHLORIDE 9 ML/HR: 600; 310; 30; 20 INJECTION, SOLUTION INTRAVENOUS at 11:28

## 2021-01-08 RX ADMIN — CEFAZOLIN SODIUM 3 G: 10 INJECTION, POWDER, FOR SOLUTION INTRAVENOUS at 22:10

## 2021-01-08 RX ADMIN — BUPIVACAINE HYDROCHLORIDE 5 ML: 2.5 INJECTION, SOLUTION EPIDURAL; INFILTRATION; INTRACAUDAL; PERINEURAL at 14:19

## 2021-01-08 RX ADMIN — SODIUM CHLORIDE 50 ML/HR: 4.5 INJECTION, SOLUTION INTRAVENOUS at 18:09

## 2021-01-08 RX ADMIN — PREGABALIN 75 MG: 75 CAPSULE ORAL at 11:28

## 2021-01-08 RX ADMIN — TRANEXAMIC ACID 1000 MG: 100 INJECTION, SOLUTION INTRAVENOUS at 13:46

## 2021-01-08 RX ADMIN — PHENYLEPHRINE HYDROCHLORIDE 100 MCG: 10 INJECTION INTRAVENOUS at 13:52

## 2021-01-08 RX ADMIN — CEFAZOLIN SODIUM 2 G: 2 INJECTION, SOLUTION INTRAVENOUS at 13:34

## 2021-01-08 RX ADMIN — LIDOCAINE HYDROCHLORIDE: 10 INJECTION, SOLUTION EPIDURAL; INFILTRATION; INTRACAUDAL; PERINEURAL at 11:28

## 2021-01-08 RX ADMIN — BUPIVACAINE HYDROCHLORIDE 5 ML: 2.5 INJECTION, SOLUTION EPIDURAL; INFILTRATION; INTRACAUDAL; PERINEURAL at 16:14

## 2021-01-08 RX ADMIN — SODIUM CHLORIDE, POTASSIUM CHLORIDE, SODIUM LACTATE AND CALCIUM CHLORIDE: 600; 310; 30; 20 INJECTION, SOLUTION INTRAVENOUS at 13:31

## 2021-01-08 RX ADMIN — PHENYLEPHRINE HYDROCHLORIDE 100 MCG: 10 INJECTION INTRAVENOUS at 14:38

## 2021-01-08 NOTE — H&P
Pre-Op H&P  Earnest Arellano [place]  [id number]  1950      Chief complaint: Right shoulder pain      Subjective:  Patient is a 70 y.o.male presents for scheduled surgery by Dr. Parekh. He anticipates a right total shoulder arthroplasty today. His shoulder has been painful with limited ROM for many years. He denies use of assistive device for ambulation or recent falls. conservative treatments failed to improve pain or ROM.       Review of Systems:  Constitutional-- No fever, chills or sweats. No fatigue.  CV-- No chest pain, palpitation or syncope. +htn, hld, cad. +cardiac clearance   Resp-- No SOB, cough, hemoptysis  Skin--No rashes or lesions      Allergies:   Allergies   Allergen Reactions   • Bee Venom Swelling         Home Meds:  Medications Prior to Admission   Medication Sig Dispense Refill Last Dose   • ASPIRIN 81 PO Take 81 mg by mouth Every Morning. Did not stop for surgery per cardiologist   1/7/2021 at 2200   • atorvastatin (LIPITOR) 20 MG tablet Take 20 mg by mouth Every Night.   1/7/2021 at 2200   • bumetanide (BUMEX) 2 MG tablet Take 2 mg by mouth Every Morning.   1/7/2021 at 1800   • diphenhydrAMINE (BENADRYL) 50 MG capsule Take 50 mg by mouth At Night As Needed.   1/7/2021 at 2200   • metoprolol succinate XL (TOPROL-XL) 50 MG 24 hr tablet Take 50 mg by mouth Every Night.   1/7/2021 at 2200   • sacubitril-valsartan (Entresto)  MG tablet Take 1 tablet by mouth 2 (Two) Times a Day.   1/8/2021 at 0630   • spironolactone (ALDACTONE) 25 MG tablet Take 25 mg by mouth Daily. Only takes qd   1/8/2021 at 0630   • benzoyl peroxide 5 % external liquid Apply  topically to the appropriate area as directed. 148 g 0    • nitroglycerin (NITROSTAT) 0.4 MG SL tablet Place 0.4 mg under the tongue Every 5 (Five) Minutes As Needed for Chest Pain. 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    result of viral pneumonia; sees Dr Onofre every six months; last seen and cleared for surgery dec 2020     PSH:    Past Surgical History:   Procedure Laterality Date   • CARDIAC CATHETERIZATION  2017    stent inserted x2    • REPLACEMENT TOTAL KNEE Bilateral    • TONSILLECTOMY  1957   • TOTAL ELBOW REPLACEMENT Left 2011       Immunization History:  Influenza: 2020  Pneumococcal: UTD  Tetanus: UTD      Social History:   Tobacco:   Social History     Tobacco Use   Smoking Status Never Smoker   Smokeless Tobacco Never Used      Alcohol:     Social History     Substance and Sexual Activity   Alcohol Use Not Currently    Comment: occasionally          Physical Exam:/82 (BP Location: Right arm, Patient Position: Sitting)   Pulse 91   Temp 97.4 °F (36.3 °C) (Temporal)   Resp 16   Ht 177.8 cm (70\")   Wt (!) 138 kg (305 lb)   SpO2 95%   BMI 43.76 kg/m²       General Appearance:    Alert, cooperative, no distress, appears stated age   Head:    Normocephalic, without obvious abnormality, atraumatic   Lungs:     Clear to auscultation bilaterally, respirations unlabored    Heart:   Regular rate and rhythm, S1 and S2 normal, no murmur, rub    or gallop    Abdomen:    Soft without tenderness. Obese    Breast Exam:    deferred   Genitalia:    deferred   Extremities:   Extremities normal, atraumatic, no cyanosis or edema   Skin:   Skin color, texture, turgor normal, no rashes or lesions   Neurologic:   Grossly intact     Results Review:     LABS:  Lab Results   Component Value Date    WBC 11.15 (H) 01/07/2021    HGB 14.3 01/07/2021    HCT 44.6 01/07/2021    MCV 91.8 01/07/2021     01/07/2021    NEUTROABS 7.07 (H) 01/07/2021    GLUCOSE 82 01/07/2021    BUN 21 01/07/2021    CREATININE 0.93 01/07/2021    EGFRIFNONA " 81 01/07/2021    EGFRIFAFRI 98 01/07/2021     (L) 01/07/2021    K 4.4 01/07/2021    CL 98 01/07/2021    CO2 24.0 01/07/2021    CALCIUM 9.7 01/07/2021    ALBUMIN 4.40 01/07/2021    AST 21 01/07/2021    ALT 25 01/07/2021    BILITOT 0.3 01/07/2021       RADIOLOGY:  Imaging Results (Last 72 Hours)     ** No results found for the last 72 hours. **          I reviewed the patient's new clinical results.    Cancer Staging (if applicable)  Cancer Patient: __ yes __no __unknown; If yes, clinical stage T:__ N:__M:__, stage group or __N/A      Impression: Right shoulder pain      Plan: RIGHT TOTAL SHOULDER ARTHROPLASTY      Leticia Manzanares, MAC   1/8/2021   11:47 EST

## 2021-01-08 NOTE — ANESTHESIA PREPROCEDURE EVALUATION
Anesthesia Evaluation     Patient summary reviewed and Nursing notes reviewed   no history of anesthetic complications:  NPO Solid Status: > 8 hours  NPO Liquid Status: > 2 hours           Airway   Mallampati: III  TM distance: >3 FB  Neck ROM: full  No difficulty expected  Dental - normal exam     Pulmonary - negative pulmonary ROS and normal exam    breath sounds clear to auscultation  Cardiovascular - normal exam    ECG reviewed  Rhythm: regular  Rate: normal    (+) hypertension, CAD, cardiac stents (3 yrs ago)     ROS comment: Cardiology clearance from 12/20 on chart, states improved LV function, low risk for cardiac events.    Neuro/Psych- negative ROS  GI/Hepatic/Renal/Endo    (+) morbid obesity,      Musculoskeletal     Abdominal    Substance History      OB/GYN          Other   arthritis,                      Anesthesia Plan    ASA 3     general with block   (Right interscalene block/catheter with arrow pump for post-operative analgesia per request of Dr. Parekh)  intravenous induction     Anesthetic plan, all risks, benefits, and alternatives have been provided, discussed and informed consent has been obtained with: patient.    Plan discussed with CRNA.

## 2021-01-08 NOTE — OP NOTE
Operative Report Reverse Total Shoulder Arthroplasty    DATE OF OPERATION: 01/08/21    PREOPERATIVE DIAGNOSIS: right shoulder glenohumeral arthropathy      POSTOPERATIVE DIAGNOSES:   right shoulder glenohumeral arthropathy with high grade partial thickness subscapularis and infraspinatus tear     PROCEDURES PERFORMED:  1. right reverse total shoulder arthroplasty.      SURGEON: Trip Parekh MD    ASSISTANTS:  1. Scarlet Sky PA-C  ** Please note the physician assistant was medically necessary to assist with positioning retraction, arm positioning, care of soft tissues and closure      ANESTHESIA: General plus block.      ESTIMATED BLOOD LOSS:150mL.      COMPLICATIONS: None.      DISPOSITION: Recovery room in stable condition.      IMPLANTS:  Arthrex reverse total shoulder system  Humeral Stem: size 12 short  Humeral Tray: centered 42 at 135 degrees  Polyethylene Liner: 42+6 constrained  Baseplate: 28 +2, 20mm central screw  Glenosphere: 42 +4    INDICATIONS: This is a 70-year-old male with right shoulder pain and limited function and motion secondary to above diagnosis. They have failed conservative treatment and after a discussion of risks, benefits, and alternatives, wished to proceed with shoulder arthroplasty.    DESCRIPTION OF PROCEDURE: On the day of surgery, the patient identified the right shoulder as the correct operative extremity. This was initialed by the surgeon with the patients's acknowledgment. The patient underwent placement of an interscalene block and was taken to the operating room and placed in the supine position. Upon induction of adequate anesthesia, the patient was brought up to the beach chair position and the shoulder and upper extremity were prepped and draped in the usual sterile fashion. Timeout confirmed the correct patient and operative extremity as well as that antibiotics were on board. A standard deltopectoral approach to the shoulder was carried out. It was carried sharply  through the skin and subcutaneous tissue. Medial and lateral flaps were developed over the deltopectoral fascia. The cephalic vein was identified and mobilized laterally with the deltoid. The subdeltoid and subpectoral spaces were mobilized and a blunt retractor was placed deep to this. The clavipectoral fascia was opened on the lateral edge of the conjoined tendon and the retractor was moved deep to this. The leading edge of the pectoralis was released exposing the long head of the biceps. This was tenosynovitic and therefore tenotomized. The 3 sisters were identified and coagulated. A subscapularis tenotomy was performed and rotator interval was released to the glenoid exposing the humeral head. The inferior capsule was released directly off the humerus to allow greater than 90° of external rotation. The rotator cuff was inspected and a high grade partial thickness tear of the subscap and infraspinatus was encountered. As such a reverse TSA was elected.  The anatomic neck was exposed and the humeral head osteotomy was performed in approximately 20° of retroversion. The remainder of the osteophytes were removed. The canal was then entered, reamed, and broached. The final stem impacted in in approximately 20° of retroversion. A head protector was placed. The humerus was subluxed posteriorly. The glenoid exposed. Circumferential labral excision and capsular release were performed. A  mobilization of the subscapularis was carried out as well.  A centering hole was drilled. The glenoid was gently reamed and then the  central hole for the baseplate was drilled  glenoid baseplate inserted.  Screws were then placed through the baseplate  The glenosphere was then inserted and locked into place with a set screw.  The humerus was carefully subluxed back anteriorly. A liner tray and polyethylene were placed and trialing was carried out. The appropriate final sizes were chosen and locked into place.  The shoulder was then  reduced.  This allowed nearly full passive range of motion with no instability. The joint was copiously irrigated with orthopedic irrigation mixed with Betadine after the final implants were assembled and locked into place.      vancomycin powder was placed in the wound      The deltopectoral interval was approximated with 0 Vicryl, the subcutaneous tissue with 2-0 Vicryl, and the skin with nylon. A sterile dressing was placed. Anesthesia was reversed and the patient was taken to the recovery room in stable condition. All instrument, needle, and sponge counts were correct.      Trip Parekh MD, MS

## 2021-01-08 NOTE — ANESTHESIA PROCEDURE NOTES
Airway  Urgency: elective    Date/Time: 1/8/2021 1:37 PM  Airway not difficult    General Information and Staff    Patient location during procedure: OR  CRNA: Gay Donovan CRNA    Indications and Patient Condition  Indications for airway management: airway protection    Preoxygenated: yes  MILS not maintained throughout  Mask difficulty assessment: 1 - vent by mask    Final Airway Details  Final airway type: endotracheal airway      Successful airway: ETT  Cuffed: yes   Successful intubation technique: direct laryngoscopy  Facilitating devices/methods: intubating stylet  Endotracheal tube insertion site: oral  Blade: Moody  Blade size: 3  ETT size (mm): 7.0  Cormack-Lehane Classification: grade I - full view of glottis  Placement verified by: chest auscultation and capnometry   Measured from: lips  ETT/EBT  to lips (cm): 20  Number of attempts at approach: 1  Assessment: lips, teeth, and gum same as pre-op and atraumatic intubation    Additional Comments  Negative epigastric sounds, Breath sound equal bilaterally with symmetric chest rise and fall.  Teeth intact, atraumatic

## 2021-01-08 NOTE — ANESTHESIA PROCEDURE NOTES
Peripheral Block      Patient reassessed immediately prior to procedure    Patient location during procedure: pre-op  Reason for block: at surgeon's request and post-op pain management  Performed by  CRNA: Yahir Villalta CRNA  Assisted by: Lotus Loja RN  Preanesthetic Checklist  Completed: patient identified, site marked, surgical consent, pre-op evaluation, timeout performed, IV checked, risks and benefits discussed and monitors and equipment checked  Prep:  Sterile barriers:cap, gloves, mask and sterile barriers  Prep: ChloraPrep  Patient monitoring: blood pressure monitoring, continuous pulse oximetry and EKG  Procedure  Sedation:yes  Performed under: local infiltration  Guidance:ultrasound guided  Images:still images obtained, printed/placed on chart    Laterality:right  Block Type:interscalene  Injection Technique:catheter  Needle Type:Tuohy and echogenic  Needle Gauge:18 G  Resistance on Injection: none  Catheter Size:20 G (20g)  Cath Depth at skin: 10 cm    Medications Used: fentaNYL citrate (PF) (SUBLIMAZE) injection, 100 mcg  bupivacaine PF (MARCAINE) 0.25 % injection, 15 mL  Med admintered at 1/8/2021 12:10 PM      Post Assessment  Injection Assessment: negative aspiration for heme, no paresthesia on injection and incremental injection  Patient Tolerance:comfortable throughout block  Complications:no  Additional Notes  Procedure:                 The pt was placed in semifowlers position with a slight tilt of the thorax contralateral to the insertion site.  The Insertion Site was prepped and draped in sterile fashion.  The pt was anesthetized with  IV Sedation( see meds) and  Skin and cutaneous tissue was infiltrated and anesthetized with 1% Lidocaine 3 mls via a 25g needle.  Utilizing ultrasound guidance, a BBraun 2 inch 18 g Contiplex echogenic touhy needle was advanced in-plane.  Hydro dissection of tissue was achieved with Normal saline. Major vessels(carotid and Internal Jugular) where  visualized as the brachial plexus was approached at the approximate level of C-7/ T-1.  Cervical 5 and Branches of Cervical 6 nerve roots where visualized and the needle tip was placed posterior at the level of C-6 roots.  LA spread was visualized and injection was made incrementally every 5 mls with aspiration. Injection pressure was normal or little, there was no intraneural injection, no vascular injection.      The BBraun 20 g wire stylet  catheter was then placed under US guidance on the posterior aspect of the Brachial Plexus.  Location of catheter was confirmed with NS injection visualized with US . The tuohy was then removed and the skin was sealed with Skin AFix at catheter insertion site.  Skin was prepped with mastisol and the labeled catheter  was secured with steristrips and a CHG tegaderm. Thank You.

## 2021-01-08 NOTE — ANESTHESIA POSTPROCEDURE EVALUATION
Patient: Earnest Abarca    Procedure Summary     Date: 01/08/21 Room / [facility]:  [facility]    Anesthesia Start: 1331 Anesthesia Stop: 1629    Procedure: RIGHT REVERSE TOTAL SHOULDER ARTHROPLASTY (Right Shoulder) Diagnosis:       Glenohumeral arthritis, right      (R shoulder glneohumeral arthritis)    Surgeon: Trip Parekh MD Provider: Randall Carmona MD    Anesthesia Type: general with block ASA Status: 3          Anesthesia Type: general with block    Vitals  Vitals Value Taken Time   BP     Temp     Pulse 57 01/08/21 1628   Resp     SpO2 90 % 01/08/21 1628   Vitals shown include unvalidated device data.        Post Anesthesia Care and Evaluation    Patient location during evaluation: PACU  Patient participation: complete - patient participated  Level of consciousness: awake and alert  Pain management: adequate  Airway patency: patent  Anesthetic complications: No anesthetic complications  PONV Status: none  Cardiovascular status: hemodynamically stable and acceptable  Respiratory status: nonlabored ventilation, acceptable and nasal cannula  Hydration status: acceptable

## 2021-01-08 NOTE — H&P
Patient Name: Earnest Abarca  MRN: [id number]  : 1950  DOS: 2021    Attending: Trip Parekh MD    Primary Care Provider: Verena Boucher PA      Chief complaint: Shoulder pain    Subjective   Patient is a pleasant 70 y.o. male presented for scheduled surgery by Dr. Parekh.  Patient has had right shoulder pain and limited mobility and function due to arthropathy and rotator cuff tear.  He failed conservative management and opted to proceed with surgery.  I saw him preoperatively.  Doing fairly well.  No complains of nausea, vomiting, or shortness of breath.  He has history of viral cardiomyopathy for which she has been followed by Dr. Steiner.  He is usually seen every 6 months and his last echo has been reported with an EF of at least 50%.  Patient has no orthopnea no PND and no lower extremity swelling.  No complains of chest pain and no angina equivalent.    (Chart review shows patient to have undergone right reverse total shoulder arthroplasty later under general anesthesia and a block and was admitted subsequently for further management.)    Allergies   Allergen Reactions   • Bee Venom Swelling       Meds:  Medications Prior to Admission   Medication Sig Dispense Refill Last Dose   • ASPIRIN 81 PO Take 81 mg by mouth Every Morning. Did not stop for surgery per cardiologist   2021 at 2200   • atorvastatin (LIPITOR) 20 MG tablet Take 20 mg by mouth Every Night.   2021 at 2200   • bumetanide (BUMEX) 2 MG tablet Take 2 mg by mouth Every Morning.   2021 at 1800   • diphenhydrAMINE (BENADRYL) 50 MG capsule Take 50 mg by mouth At Night As Needed.   2021 at 2200   • metoprolol succinate XL (TOPROL-XL) 50 MG 24 hr tablet Take 50 mg by mouth Every Night.   2021 at 2200   • sacubitril-valsartan (Entresto)  MG tablet Take 1 tablet by mouth 2 (Two) Times a Day.   2021 at 0630   • spironolactone (ALDACTONE) 25 MG tablet Take 25 mg by mouth Daily. Only takes qd   2021 at  "0630   • benzoyl peroxide 5 % external liquid Apply  topically to the appropriate area as directed. 148 g 0    • nitroglycerin (NITROSTAT) 0.4 MG SL tablet Place 0.4 mg under the tongue Every 5 (Five) Minutes As Needed for Chest Pain. 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 No pertinent family history.  Social History     Tobacco Use   • Smoking status: Never Smoker   • Smokeless tobacco: Never Used   Substance Use Topics   • Alcohol use: Not Currently     Comment: occasionally    • Drug use: Not Currently   .  Semiretired from construction work.    Review of Systems  Pertinent items are noted in HPI, all other systems reviewed and negative    Vital Signs  /71   Pulse 81   Temp 97.6 °F (36.4 °C)   Resp 16   Ht 177.8 cm (70\")   Wt (!) 138 kg (305 lb)   SpO2 93%   BMI 43.76 kg/m²     Physical Exam:    General Appearance:    Alert, cooperative, in no acute distress   Head:    Normocephalic, without obvious abnormality, atraumatic   Eyes:            Lids and lashes normal, conjunctivae and sclerae normal, no   icterus, no pallor, corneas clear,    Ears:    Ears appear intact with no abnormalities noted   Throat:   No oral lesions, no thrush, oral mucosa moist   Neck:   No adenopathy, supple, trachea midline, no thyromegaly         Lungs:   "   Clear to auscultation,respirations regular, even and                   unlabored    Heart:    Regular rhythm and normal rate, normal S1 and S2, no            murmur, no gallop   Abdomen:     Normal bowel sounds, no masses, no organomegaly, soft        non-tender, non-distended, no guarding, no rebound                 tenderness.  Obese   Genitalia:    Deferred   Extremities:   Moves all extremities well, no edema, no cyanosis, no              redness   Pulses:   Pulses palpable and equal bilaterally   Skin:   No bleeding, bruising or rash   Neurologic:   Cranial nerves 2 - 12 grossly intact,       I reviewed the patient's new clinical results.       Results from last 7 days   Lab Units 01/07/21  1603   WBC 10*3/mm3 11.15*   HEMOGLOBIN g/dL 14.3   HEMATOCRIT % 44.6   PLATELETS 10*3/mm3 293     Results from last 7 days   Lab Units 01/07/21  1603   SODIUM mmol/L 135*   POTASSIUM mmol/L 4.4   CHLORIDE mmol/L 98   CO2 mmol/L 24.0   BUN mg/dL 21   CREATININE mg/dL 0.93   CALCIUM mg/dL 9.7   BILIRUBIN mg/dL 0.3   ALK PHOS U/L 105   ALT (SGPT) U/L 25   AST (SGOT) U/L 21   GLUCOSE mg/dL 82     Lab Results   Component Value Date    HGBA1C 6.40 (H) 01/07/2021     Assessment and Plan:       Glenohumeral arthritis, right    HTN (hypertension)    Hyperlipidemia    CAD (coronary artery disease)    Viral cardiomyopathy (CMS/HCC)    Status post reverse total arthroplasty of right shoulder      Plan  Postop management to include  1. PT/OT  2. Pain control-prns, interscalene nerve block catheter   3. IS-encourage  4. DVT proph- mechanicals  5. Bowel regimen  6. Resume home medications as appropriate  7. Monitor post-op labs  8. DC planning for home    Cardiac medications resumed as appropriate.  Statin resumed for dyslipidemia.    Resuming aspirin and beta-blocker and statin for patient's known coronary artery disease.  Patient will be advised to follow-up with primary care provider regarding elevated hemoglobin A1c in prediabetic  rubi.      Dragon disclaimer:  Part of this encounter note is an electronic transcription/translation of spoken language to printed text. The electronic translation of spoken language may permit erroneous, or at times, nonsensical words or phrases to be inadvertently transcribed; Although I have reviewed the note for such errors, some may still exist.    Sowmya Dawson MD  01/08/21  17:13 EST

## 2021-01-09 VITALS
HEIGHT: 70 IN | WEIGHT: 305 LBS | HEART RATE: 87 BPM | OXYGEN SATURATION: 91 % | TEMPERATURE: 98.1 F | RESPIRATION RATE: 18 BRPM | SYSTOLIC BLOOD PRESSURE: 125 MMHG | BODY MASS INDEX: 43.67 KG/M2 | DIASTOLIC BLOOD PRESSURE: 78 MMHG

## 2021-01-09 PROBLEM — G89.18 POSTOPERATIVE PAIN: Status: ACTIVE | Noted: 2021-01-09

## 2021-01-09 LAB
ANION GAP SERPL CALCULATED.3IONS-SCNC: 12 MMOL/L (ref 5–15)
BASOPHILS # BLD AUTO: 0.03 10*3/MM3 (ref 0–0.2)
BASOPHILS NFR BLD AUTO: 0.2 % (ref 0–1.5)
BUN SERPL-MCNC: 17 MG/DL (ref 8–23)
BUN/CREAT SERPL: 16.7 (ref 7–25)
CALCIUM SPEC-SCNC: 8.8 MG/DL (ref 8.6–10.5)
CHLORIDE SERPL-SCNC: 102 MMOL/L (ref 98–107)
CO2 SERPL-SCNC: 20 MMOL/L (ref 22–29)
CREAT SERPL-MCNC: 1.02 MG/DL (ref 0.76–1.27)
DEPRECATED RDW RBC AUTO: 53.4 FL (ref 37–54)
EOSINOPHIL # BLD AUTO: 0 10*3/MM3 (ref 0–0.4)
EOSINOPHIL NFR BLD AUTO: 0 % (ref 0.3–6.2)
ERYTHROCYTE [DISTWIDTH] IN BLOOD BY AUTOMATED COUNT: 14.8 % (ref 12.3–15.4)
GFR SERPL CREATININE-BSD FRML MDRD: 72 ML/MIN/1.73
GFR SERPL CREATININE-BSD FRML MDRD: 88 ML/MIN/1.73
GLUCOSE SERPL-MCNC: 131 MG/DL (ref 65–99)
HCT VFR BLD AUTO: 46.1 % (ref 37.5–51)
HGB BLD-MCNC: 13.8 G/DL (ref 13–17.7)
IMM GRANULOCYTES # BLD AUTO: 0.07 10*3/MM3 (ref 0–0.05)
IMM GRANULOCYTES NFR BLD AUTO: 0.4 % (ref 0–0.5)
LYMPHOCYTES # BLD AUTO: 1.65 10*3/MM3 (ref 0.7–3.1)
LYMPHOCYTES NFR BLD AUTO: 9.9 % (ref 19.6–45.3)
MCH RBC QN AUTO: 29.6 PG (ref 26.6–33)
MCHC RBC AUTO-ENTMCNC: 29.9 G/DL (ref 31.5–35.7)
MCV RBC AUTO: 98.9 FL (ref 79–97)
MONOCYTES # BLD AUTO: 1.29 10*3/MM3 (ref 0.1–0.9)
MONOCYTES NFR BLD AUTO: 7.7 % (ref 5–12)
NEUTROPHILS NFR BLD AUTO: 13.64 10*3/MM3 (ref 1.7–7)
NEUTROPHILS NFR BLD AUTO: 81.8 % (ref 42.7–76)
NRBC BLD AUTO-RTO: 0 /100 WBC (ref 0–0.2)
PLATELET # BLD AUTO: 249 10*3/MM3 (ref 140–450)
PMV BLD AUTO: 10.3 FL (ref 6–12)
POTASSIUM SERPL-SCNC: 4.5 MMOL/L (ref 3.5–5.2)
RBC # BLD AUTO: 4.66 10*6/MM3 (ref 4.14–5.8)
SODIUM SERPL-SCNC: 134 MMOL/L (ref 136–145)
WBC # BLD AUTO: 16.68 10*3/MM3 (ref 3.4–10.8)

## 2021-01-09 PROCEDURE — 63710000001 SPIRONOLACTONE 25 MG TABLET: Performed by: INTERNAL MEDICINE

## 2021-01-09 PROCEDURE — 25010000002 CEFAZOLIN PER 500 MG: Performed by: ORTHOPAEDIC SURGERY

## 2021-01-09 PROCEDURE — 97165 OT EVAL LOW COMPLEX 30 MIN: CPT

## 2021-01-09 PROCEDURE — 97535 SELF CARE MNGMENT TRAINING: CPT

## 2021-01-09 PROCEDURE — A9270 NON-COVERED ITEM OR SERVICE: HCPCS | Performed by: INTERNAL MEDICINE

## 2021-01-09 PROCEDURE — 97110 THERAPEUTIC EXERCISES: CPT

## 2021-01-09 PROCEDURE — 63710000001 SACUBITRIL-VALSARTAN 97-103 MG TABLET: Performed by: INTERNAL MEDICINE

## 2021-01-09 PROCEDURE — 63710000001 METOPROLOL SUCCINATE XL 50 MG TABLET SUSTAINED-RELEASE 24 HOUR: Performed by: INTERNAL MEDICINE

## 2021-01-09 PROCEDURE — 63710000001 OXYCODONE 5 MG TABLET: Performed by: ORTHOPAEDIC SURGERY

## 2021-01-09 PROCEDURE — 97530 THERAPEUTIC ACTIVITIES: CPT

## 2021-01-09 PROCEDURE — 63710000001 ASPIRIN 81 MG TABLET DELAYED-RELEASE: Performed by: INTERNAL MEDICINE

## 2021-01-09 PROCEDURE — A9270 NON-COVERED ITEM OR SERVICE: HCPCS | Performed by: ORTHOPAEDIC SURGERY

## 2021-01-09 PROCEDURE — 80048 BASIC METABOLIC PNL TOTAL CA: CPT | Performed by: ORTHOPAEDIC SURGERY

## 2021-01-09 PROCEDURE — 85025 COMPLETE CBC W/AUTO DIFF WBC: CPT | Performed by: ORTHOPAEDIC SURGERY

## 2021-01-09 RX ORDER — BUMETANIDE 2 MG/1
2 TABLET ORAL EVERY EVENING
Status: DISCONTINUED | OUTPATIENT
Start: 2021-01-09 | End: 2021-01-09 | Stop reason: HOSPADM

## 2021-01-09 RX ORDER — OXYCODONE HYDROCHLORIDE 5 MG/1
5 TABLET ORAL EVERY 4 HOURS PRN
Qty: 40 TABLET | Refills: 0 | Status: SHIPPED | OUTPATIENT
Start: 2021-01-09

## 2021-01-09 RX ADMIN — OXYCODONE 5 MG: 5 TABLET ORAL at 12:16

## 2021-01-09 RX ADMIN — METOPROLOL SUCCINATE 50 MG: 50 TABLET, EXTENDED RELEASE ORAL at 00:17

## 2021-01-09 RX ADMIN — OXYCODONE HYDROCHLORIDE 5 MG: 5 TABLET ORAL at 08:01

## 2021-01-09 RX ADMIN — SACUBITRIL AND VALSARTAN 1 TABLET: 97; 103 TABLET, FILM COATED ORAL at 00:19

## 2021-01-09 RX ADMIN — ASPIRIN 81 MG: 81 TABLET, FILM COATED ORAL at 08:01

## 2021-01-09 RX ADMIN — SPIRONOLACTONE 25 MG: 25 TABLET ORAL at 08:01

## 2021-01-09 RX ADMIN — CEFAZOLIN SODIUM 3 G: 10 INJECTION, POWDER, FOR SOLUTION INTRAVENOUS at 05:04

## 2021-01-09 RX ADMIN — SACUBITRIL AND VALSARTAN 1 TABLET: 97; 103 TABLET, FILM COATED ORAL at 08:05

## 2021-01-09 NOTE — PLAN OF CARE
Goal Outcome Evaluation:  Plan of Care Reviewed With: patient  Progress: improving   Pt is alert & oriented x4. On 4L NC with O2 sats running 91-92%. Pt has had no c/o of pain this shift and is using his ice packs. Ropivicaine infusing at 6mL/hr. Pt has his sling on and his aquacel is clean, dry, and intact. Pt is voiding spontaneously using urinal. Will continue to monitor.

## 2021-01-09 NOTE — PROGRESS NOTES
[facility]    Acute pain service Inpatient Progress Note    Patient Name: Earnest Abarca  :  1950  MRN:  [id number]        Acute Pain  Service Inpatient Progress Note:    Analgesia:Good  Pain Score:4/10  LOC: alert and awake  Resp Status: room air  Cardiac: VS stable  Side Effects:None  Catheter Site:clean, dressing intact and dry  Cath type: peripheral nerve cath with ON Q  Infusion rate: 6ml/hr  Dosing/Volume: ropivacaine 0.2%  Catheter Plan:Catheter to remain Insitu and Continue catheter infusion rate unchanged  Comments: ---------------------------------------------------------------------------------  Physical Therapy Manual Muscle Testing Results:   ]    Physical Therapy - Plan of Care Review - Outcome Summary:   ]    Occupational Therapy - Plan of Care Review - Outcome Summary:   ]  ----------------------------------------------------------------------------------

## 2021-01-09 NOTE — PROGRESS NOTES
Orthopedic Daily Progress Note      CC: s/p reverse tsa    Pain well controlled  General: no fevers, chills  Abdomen: no nausea, vomiting, or diarrhea    No other complaints  Int Hx:  Requiring 02 overnight and this AM    Physical Exam:  I have reviewed the vital signs.  Temp:  [97 °F (36.1 °C)-98.4 °F (36.9 °C)] 98.4 °F (36.9 °C)  Heart Rate:  [] 98  Resp:  [16-20] 20  BP: ()/(56-93) 156/93    Objective  General Appearance:    Alert, cooperative, no distress  Extremities: No clubbing, cyanosis, or edema to lower extremities  Pulses:  2+ in distal surgical extremity  Skin: Dressing Clean/dry/intact      Results Review:    I have reviewed the labs, radiology results and diagnostic studies:    Results from last 7 days   Lab Units 01/09/21  0658   WBC 10*3/mm3 16.68*   HEMOGLOBIN g/dL 13.8   PLATELETS 10*3/mm3 249     Results from last 7 days   Lab Units 01/09/21  0658   SODIUM mmol/L 134*   POTASSIUM mmol/L 4.5   CO2 mmol/L 20.0*   CREATININE mg/dL 1.02   GLUCOSE mg/dL 131*       I have reviewed the medications.    Assessment/Problem List  POD# 1 Day Post-Op   S/p reverse TSA    Plan  1) sling- elbow wrist and hand ROM  2) dressing to remain in place until fu  3) continue to wean 02 as he can tolerate  4) OT        Discharge Planning: I expect patient to be discharged to home when medically read (02 sats apprpriate off 02).    Trip Parekh MD  01/09/21  09:22 EST

## 2021-01-09 NOTE — DISCHARGE SUMMARY
Patient Name: Earnest Abarca  MRN: [id number]  : 1950  DOS: 2021    Attending: Trip Parekh MD    Primary Care Provider: Verena Boucher PA    Date of Admission:.2021 10:49 AM    Date of Discharge:  2021    Discharge Diagnosis:       Status post reverse total arthroplasty of right shoulder  Glenohumeral arthritis, right    HTN (hypertension)    Hyperlipidemia    CAD (coronary artery disease)    Viral cardiomyopathy (CMS/HCC)        Hospital Course    At admit:     Patient is a pleasant 70 y.o. male presented for scheduled surgery by Dr. Parekh.  Patient has had right shoulder pain and limited mobility and function due to arthropathy and rotator cuff tear.  He failed conservative management and opted to proceed with surgery.  I saw him preoperatively.  Doing fairly well.  No complains of nausea, vomiting, or shortness of breath.  He has history of viral cardiomyopathy for which she has been followed by Dr. Steiner.  He is usually seen every 6 months and his last echo has been reported with an EF of at least 50%.  Patient has no orthopnea no PND and no lower extremity swelling.  No complains of chest pain and no angina equivalent.     (Chart review shows patient to have undergone right reverse total shoulder arthroplasty later under general anesthesia and a block and was admitted subsequently for further management.)    After admit:  Patient was provided pain medications as needed for pain control, along with interscalene nerve block infusion of Ropivacaine    Adjustments were made to pain medications to optimize postop pain management.   Risks and benefits of opiate medications discussed with patient. ESA report on chart was reviewed.    The patient was seen by OT and was taught exercises for right arm.  The patient used an IS for atelectasis prophylaxis and mechanicals for DVT prophylaxis.    Home medications were resumed as appropriate, and labs were monitored and remained fairly  "stable.     With the progress he has made, Mr. Abarca is ready for DC home today.      The patient will have an arrow pump ( instructed on it during this admit)  Discussed with patient regarding plan and he shows understanding and agreement.        Procedures Performed  Procedure(s):  RIGHT REVERSE TOTAL SHOULDER ARTHROPLASTY       Pertinent Test Results:    I reviewed the patient's new clinical results.   Results from last 7 days   Lab Units 21  0658 21  1603   WBC 10*3/mm3 16.68* 11.15*   HEMOGLOBIN g/dL 13.8 14.3   HEMATOCRIT % 46.1 44.6   PLATELETS 10*3/mm3 249 293     Results from last 7 days   Lab Units 21  0658 21  1603   SODIUM mmol/L 134* 135*   POTASSIUM mmol/L 4.5 4.4   CHLORIDE mmol/L 102 98   CO2 mmol/L 20.0* 24.0   BUN mg/dL 17 21   CREATININE mg/dL 1.02 0.93   CALCIUM mg/dL 8.8 9.7   BILIRUBIN mg/dL  --  0.3   ALK PHOS U/L  --  105   ALT (SGPT) U/L  --  25   AST (SGOT) U/L  --  21   GLUCOSE mg/dL 131* 82     I reviewed the patient's new imaging including images and reports.      Occupational Therapy  Outcome Summary: OT erik completed. Pt/CG. educated on weight beating restrictions and shoulder precautions for ADLs with good understanding.  Provided education on axillary care, bjorn technique for UBD, Interscalene catheter mgmt, sling mgmt, and HEP. Pt. and CG verbalized and demonstrated understanding. Pt. passed mobility screening, ambulated 300 ft with O2 >90 on RA with good safety awareness and no LOB. Recommend home with assist at D/C.       Discharge Assessment:       Visit Vitals  /93 (BP Location: Right leg, Patient Position: Lying)   Pulse 98   Temp 98.4 °F (36.9 °C) (Oral)   Resp 20   Ht 177.8 cm (70\")   Wt (!) 138 kg (305 lb)   SpO2 91%   BMI 43.76 kg/m²     Temp (24hrs), Av.7 °F (36.5 °C), Min:97 °F (36.1 °C), Max:98.4 °F (36.9 °C)      General Appearance:    Alert, cooperative, in no acute distress   Lungs:     Clear to auscultation,respirations regular, even " and   unlabored    Heart:    Regular rhythm and normal rate, normal S1 and S2    Abdomen:     Normal bowel sounds, no masses, no organomegaly, soft        non-tender, non-distended, no guarding, no rebound                 tenderness   Extremities:  Right UE in a sling, CDI Aquacel dressing over right shoulder incision . Interscalene nerve block cath present.  Distal pulses, cap refill,  Intact.      Pulses:   Pulses palpable and equal bilaterally   Skin:   No bleeding, bruising or rash        Discharge Disposition: Home          Discharge Medications      New Medications      Instructions Start Date   oxyCODONE 5 MG immediate release tablet  Commonly known as: Roxicodone   5 mg, Oral, Every 4 Hours PRN      Ropivacine HCl-NaCl  Commonly known as: NAROPIN   6 mL/hr (12 mg/hr), Peripheral Nerve, Continuous         Continue These Medications      Instructions Start Date   ASPIRIN 81 PO   81 mg, Oral, Every Morning, Did not stop for surgery per cardiologist      atorvastatin 20 MG tablet  Commonly known as: LIPITOR   20 mg, Oral, Nightly      bumetanide 2 MG tablet  Commonly known as: BUMEX   2 mg, Oral, Every Morning      diphenhydrAMINE 50 MG capsule  Commonly known as: BENADRYL   50 mg, Oral, Nightly PRN      Entresto  MG tablet  Generic drug: sacubitril-valsartan   1 tablet, Oral, 2 Times Daily      metoprolol succinate XL 50 MG 24 hr tablet  Commonly known as: TOPROL-XL   50 mg, Oral, Nightly      nitroglycerin 0.4 MG SL tablet  Commonly known as: NITROSTAT   0.4 mg, Sublingual, Every 5 Minutes PRN, Never used      spironolactone 25 MG tablet  Commonly known as: ALDACTONE   25 mg, Oral, Daily, Only takes qd         Stop These Medications    benzoyl peroxide 5 % external liquid  Generic drug: benzoyl peroxide            Discharge Diet: Resume prehospital diet    Activity at Discharge:   NWB right upper extremity, ROM elbow, wrist, and hand per Dr. Parekh's instructions.    Follow-up Appointments  Trip  MD Iglesia per his orders         Sowmya Dawson MD  01/09/21  11:20 EST

## 2021-01-09 NOTE — PLAN OF CARE
Goal Outcome Evaluation:  Plan of Care Reviewed With: patient, spouse  Progress: (OT eval)  Outcome Summary: OT eval completed. Pt/CG. educated on weight beating restrictions and shoulder precautions for ADLs with good understanding.  Provided education on axillary care, bjorn technique for UBD, Interscalene catheter mgmt, sling mgmt, and HEP. Pt. and CG verbalized and demonstrated understanding. Pt. passed mobility screening, ambulated 300 ft with O2 >90 on RA with good safety awareness and no LOB. Recommend home with assist at D/C.

## 2021-01-09 NOTE — PROGRESS NOTES
Continued Stay Note  [facility]     Patient Name: Earnest Abarca  MRN: [id number]  Today's Date: 1/9/2021    Admit Date: 1/8/2021    Discharge Plan     Row Name 01/09/21 1158       Plan    Plan Comments  Spoke with LOGAN Blevins.  No home needs identified.  Plan to DC today.    Final Discharge Disposition Code  01 - home or self-care        Discharge Codes    No documentation.       Expected Discharge Date and Time     Expected Discharge Date Expected Discharge Time    Jan 9, 2021             Gwen Chiu RN

## 2021-01-09 NOTE — THERAPY DISCHARGE NOTE
Acute Care - Occupational Therapy Discharge  [facility]    Patient Name: Earnest Abarca  : 1950    MRN: [id number]                              Today's Date: 2021       Admit Date: 2021    Visit Dx:     ICD-10-CM ICD-9-CM   1. Status post reverse total arthroplasty of right shoulder  Z96.611 V43.61     Patient Active Problem List   Diagnosis   • Glenohumeral arthritis, right   • HTN (hypertension)   • Hyperlipidemia   • CAD (coronary artery disease)   • Viral cardiomyopathy (CMS/HCC)   • Status post reverse total arthroplasty of right shoulder   • Postoperative pain     Past Medical History:   Diagnosis Date   • Arthritis     shoulder; right    • Coronary artery disease 10/2017    stent inserted    • Elevated cholesterol    • History of viral pneumonia     caused in viral cardiomyopathy    • History of wrist fracture     right   • Hypertension    • Insomnia    • Viral cardiomyopathy (CMS/HCC) 2017    result of viral pneumonia; sees Dr Onofre every six months; last seen and cleared for surgery dec 2020     Past Surgical History:   Procedure Laterality Date   • CARDIAC CATHETERIZATION      stent inserted x2    • REPLACEMENT TOTAL KNEE Bilateral    • TONSILLECTOMY     • TOTAL ELBOW REPLACEMENT Left      General Information     Row Name 21 1228          OT Time and Intention    Document Type  evaluation;discharge evaluation/summary  -     Mode of Treatment  occupational therapy  -     Row Name 21 1228          General Information    Patient Profile Reviewed  yes  -LC     Prior Level of Function  min assist:;ADL's;independent:;transfer  -LC     Existing Precautions/Restrictions  fall;right;shoulder;non-weight bearing;other (see comments) Interscalene catheter, SLING  -     Barriers to Rehab  none identified  -     Row Name 21 1228          Living Environment    Lives With  spouse  -     Row Name 21 1228          Home Main Entrance    Number of  Stairs, Main Entrance  none  -     Row Name 01/09/21 1228          Stairs Within Home, Primary    Number of Stairs, Within Home, Primary  none  -     Row Name 01/09/21 1228          Cognition    Orientation Status (Cognition)  oriented x 4  -     Row Name 01/09/21 1228          Safety Issues, Functional Mobility    Safety Issues Affecting Function (Mobility)  safety precaution awareness;safety precautions follow-through/compliance  -     Impairments Affecting Function (Mobility)  range of motion (ROM);pain;strength  -       User Key  (r) = Recorded By, (t) = Taken By, (c) = Cosigned By    Initials Name Provider Type     Reyna Marino OT Occupational Therapist        Mobility/ADL's     Row Name 01/09/21 1230          Bed Mobility    Comment (Bed Mobility)  Sitting EOB on arrival  -     Row Name 01/09/21 1230          Transfers    Transfers  sit-stand transfer;bed-chair transfer  -     Comment (Transfers)  VC's for L hand placement. Educated pt. and CG on interscalene catheter mgmt during t/fs. Pt. and CG demonstrated and verbalized understanding.  -     Bed-Chair [place] (Transfers)  contact guard;verbal cues  -     Assistive Device (Bed-Chair Transfers)  other (see comments) Gait Belt, No AD  -     Sit-Stand [place] (Transfers)  contact guard;verbal cues  -     Row Name 01/09/21 1230          Sit-Stand Transfer    Assistive Device (Sit-Stand Transfers)  other (see comments) Gait Belt, No AD  -LC     Row Name 01/09/21 1230          Functional Mobility    Functional Mobility- Ind. Level  contact guard assist  -     Functional Mobility- Device  other (see comments) Gait Belt, No AD  -     Functional Mobility-Distance (Feet)  300  -     Functional Mobility- Comment  Pt. passed functional mobility screening. Ambulated with good safety awareness, no LOB, and O2 sat >90 throughout.  -     Row Name 01/09/21 1230          Activities of Daily Living    68889 - OT Self Care/Mgmt  Minutes  25  -     BADL Assessment/Intervention  bathing;upper body dressing;lower body dressing;toileting  -     Row Name 01/09/21 1230          Mobility    Extremity Weight-bearing Status  right upper extremity  -     Right Upper Extremity (Weight-bearing Status)  non weight-bearing (NWB)  -     Row Name 01/09/21 1230          Bathing Assessment/Intervention    [place] Level (Bathing)  other (see comments);maximum assist (25% patient effort);verbal cues;upper body Axilla  -     Comment (Bathing)  Educated pt. and CG on axillary care while maintaining shoulder precautions. Pt./CG verbalized and demonsterated understanding. Educated pt. to not shower until interscalene catheter is discontinued and cleared with MD.  -     Row Name 01/09/21 1230          Upper Body Dressing Assessment/Training    [place] Level (Upper Body Dressing)  don;front opening garment;other (see comments);verbal cues;minimum assist (75% patient effort) Sling  -     Position (Upper Body Dressing)  supported sitting  -     Comment (Upper Body Dressing)  Educated pt/CG on bjorn dressing technique, sling mgmt, and interscalene catheter mgmt during UBD while maintaining shoulder precautions. Pt/CG verbalized and demonstrated understanding of bjorn dressing tech, nerve catheter mgmt, and sling mgmt.  -     Row Name 01/09/21 1230          Lower Body Dressing Assessment/Training    [place] Level (Lower Body Dressing)  maximum assist (25% patient effort);verbal cues;don;doff;pants/bottoms;socks  -     Comment (Lower Body Dressing)  Educated pt/CG to have assistance with LBD to ensure safety with task.  -     Row Name 01/09/21 1230          Toileting Assessment/Training    [place] Level (Toileting)  adjust/manage clothing  -     Comment (Toileting)  Educated pt. to have assistance with clothing mgmt during toileting tasks for safety.  -       User Key  (r) = Recorded By, (t) = Taken By, (c) = Cosigned By     Initials Name Provider Type     Reyna Marino OT Occupational Therapist        Obj/Interventions     Row Name 01/09/21 1244          Sensory Assessment (Somatosensory)    Sensory Assessment (Somatosensory)  right UE;left UE  -     Left UE Sensory Assessment  general sensation;intact  -     Right UE Sensory Assessment  general sensation;impaired Due to Interscalene catheter  -     Row Name 01/09/21 1244          Vision Assessment/Intervention    Visual Impairment/Limitations  corrective lenses for reading  -     Row Name 01/09/21 1244          Range of Motion Comprehensive    Comment, General Range of Motion  Deferred [place] WFL  -     Row Name 01/09/21 1244          Strength Comprehensive (MMT)    Comment, General Manual Muscle Testing (MMT) Assessment  [place]erred [place] [facility]  -     Row Name 01/09/21 1244          Elbow/Forearm (Therapeutic Exercise)    Elbow/Forearm (Therapeutic Exercise)  AAROM (active assistive range of motion)  -     Elbow/Forearm AAROM (Therapeutic Exercise)  right;flexion;extension;supination;pronation;10 repetitions;sitting  -     Row Name 01/09/21 1244          Wrist (Therapeutic Exercise)    Wrist (Therapeutic Exercise)  AAROM (active assistive range of motion)  -     Wrist AAROM (Therapeutic Exercise)  right;flexion;extension;10 repetitions  -     Row Name 01/09/21 1244          Hand (Therapeutic Exercise)    Hand (Therapeutic Exercise)  AROM (active range of motion)  -     Hand AROM/AAROM (Therapeutic Exercise)  right;AROM (active range of motion);finger flexion;finger extension;thumb flexion;thumb extension;10 repetitions  -     Row Name 01/09/21 1244          Balance    Balance Assessment  sitting static balance;sitting dynamic balance;standing static balance;standing dynamic balance  -     Static Sitting Balance  WNL;unsupported;sitting, edge of bed  -     Dynamic Sitting Balance  WFL;unsupported;sitting, edge of bed  -     Static Standing Balance   WNL;unsupported;standing  -LC     Dynamic Standing Balance  WFL;unsupported;standing  -LC     Balance Interventions  sitting;standing;sit to stand;occupation based/functional task  -     Row Name 01/09/21 1244          Therapeutic Exercise    Therapeutic Exercise  elbow/forearm;wrist;hand Educated to complete HEP 3-5x/day, AROM elbow/wrist/hand only per MD order. Verbalized and demonstrated understanding.  -       User Key  (r) = Recorded By, (t) = Taken By, (c) = Cosigned By    Initials Name Provider Type    Reyna Casas OT Occupational Therapist        Goals/Plan     Row Name 01/09/21 1252          Transfer Goal 1 (OT)    Activity/Assistive Device (Transfer Goal 1, OT)  sit-to-stand/stand-to-sit  -     [place] Level/Cues Needed (Transfer Goal 1, OT)  contact guard assist;verbal cues required  -     Time Frame (Transfer Goal 1, OT)  1 day;by discharge  -     Progress/Outcome (Transfer Goal 1, OT)  goal met  -     Row Name 01/09/21 1252          Dressing Goal 1 (OT)    Activity/Device (Dressing Goal 1, OT)  upper body dressing SLING  -     [place]/Cues Needed (Dressing Goal 1, OT)  minimum assist (75% or more patient effort);verbal cues required  -     Time Frame (Dressing Goal 1, OT)  1 day;by discharge  -     Progress/Outcome (Dressing Goal 1, OT)  goal met  -     Row Name 01/09/21 1252          ROM Goal 1 (OT)    ROM Goal 1 (OT)  Pt. and CGA will demonstrate HEP with VC's and SUP by discharge.  -     Time Frame (ROM Goal 1, OT)  1 day;by discharge  -     Progress/Outcome (ROM Goal 1, OT)  goal met  -       User Key  (r) = Recorded By, (t) = Taken By, (c) = Cosigned By    Initials Name Provider Type    Reyna Casas OT Occupational Therapist        Clinical Impression     Row Name 01/09/21 1248          Pain Assessment    Additional Documentation  Pain Scale: Numbers Pre/Post-Treatment (Group)  -     Row Name 01/09/21 1248          Pain Scale: Numbers  Pre/Post-Treatment    Pretreatment Pain Rating  3/10  -     Posttreatment Pain Rating  3/10  -LC     Pain Location - Side  Right  -LC     Pain Location - Orientation  generalized  -LC     Pain Location  shoulder  -LC     Pain Intervention(s)  Cold applied;Repositioned;Ambulation/increased activity  -     Row Name 01/09/21 1248          Plan of Care Review    Plan of Care Reviewed With  patient;spouse  -     Progress  -- OT eval  -     Outcome Summary  OT eval completed. Pt/CG. educated on weight beating restrictions and shoulder precautions for ADLs with good understanding.  Provided education on axillary care, bjorn technique for UBD, Interscalene catheter mgmt, sling mgmt, and HEP. Pt. and CG verbalized and demonstrated understanding. Pt. passed mobility screening, ambulated 300 ft with O2 >90 on RA with good safety awareness and no LOB. Recommend home with assist at D/C.  -     Row Name 01/09/21 1248          Therapy Assessment/Plan (OT)    Patient/Family Therapy Goal Statement (OT)  To return home  -     Therapy Frequency (OT)  evaluation only  -     Row Name 01/09/21 1248          Therapy Plan Review/Discharge Plan (OT)    Anticipated Discharge Disposition (OT)  home with assist  -     Row Name 01/09/21 1248          Vital Signs    Pre Systolic BP Rehab  -- VSS  -     Pre SpO2 (%)  91  -LC     O2 Delivery Pre Treatment  supplemental O2  -LC     Intra SpO2 (%)  90  -LC     O2 Delivery Intra Treatment  room air  -     Post SpO2 (%)  92  -LC     O2 Delivery Post Treatment  room air  -     Pre Patient Position  Sitting  -     Intra Patient Position  Standing  -     Post Patient Position  Sitting  -     Row Name 01/09/21 1248          Positioning and Restraints    Pre-Treatment Position  in bed  -     Post Treatment Position  chair  -LC     In Chair  notified nsg;reclined;call light within reach;encouraged to call for assist;exit alarm on;with family/caregiver;legs elevated  -        User Key  (r) = Recorded By, (t) = Taken By, (c) = Cosigned By    Initials Name Provider Type    Reyna Casas OT Occupational Therapist        Outcome Measures     Row Name 01/09/21 1254          How much help from another is currently needed...    Putting on and taking off regular lower body clothing?  2  -LC     Bathing (including washing, rinsing, and drying)  2  -LC     Toileting (which includes using toilet bed pan or urinal)  3  -LC     Putting on and taking off regular upper body clothing  2  -LC     Taking care of personal grooming (such as brushing teeth)  3  -LC     Eating meals  3  -LC     AM-PAC 6 Clicks Score (OT)  15  -LC     Row Name 01/09/21 1254          Functional Assessment    Outcome Measure Options  AM-PAC 6 Clicks Daily Activity (OT)  -LC       User Key  (r) = Recorded By, (t) = Taken By, (c) = Cosigned By    Initials Name Provider Type    Reyna Casas OT Occupational Therapist        Occupational Therapy Education                 Title: PT OT SLP Therapies (Not Started)     Topic: Occupational Therapy (Not Started)     Point: ADL training (Not Started)     Description:   Instruct learner(s) on proper safety adaptation and remediation techniques during self care or transfers.   Instruct in proper use of assistive devices.              Learner Progress:  Not documented in this visit.          Point: Home exercise program (Not Started)     Description:   Instruct learner(s) on appropriate technique for monitoring, assisting and/or progressing therapeutic exercises/activities.              Learner Progress:  Not documented in this visit.          Point: Precautions (Not Started)     Description:   Instruct learner(s) on prescribed precautions during self-care and functional transfers.              Learner Progress:  Not documented in this visit.          Point: Body mechanics (Not Started)     Description:   Instruct learner(s) on proper positioning and spine alignment during  self-care, functional mobility activities and/or exercises.              Learner Progress:  Not documented in this visit.                          OT Recommendation and Plan  Retired Outcome Summary/Treatment Plan (OT)  Anticipated Discharge Disposition (OT): home with assist  Therapy Frequency (OT): evaluation only  Plan of Care Review  Plan of Care Reviewed With: patient, spouse  Progress: (OT eval)  Outcome Summary: OT eval completed. Pt/CG. educated on weight beating restrictions and shoulder precautions for ADLs with good understanding.  Provided education on axillary care, bjorn technique for UBD, Interscalene catheter mgmt, sling mgmt, and HEP. Pt. and CG verbalized and demonstrated understanding. Pt. passed mobility screening, ambulated 300 ft with O2 >90 on RA with good safety awareness and no LOB. Recommend home with assist at D/C.  Plan of Care Reviewed With: patient, spouse  Outcome Summary: OT eval completed. Pt/CG. educated on weight beating restrictions and shoulder precautions for ADLs with good understanding.  Provided education on axillary care, bjorn technique for UBD, Interscalene catheter mgmt, sling mgmt, and HEP. Pt. and CG verbalized and demonstrated understanding. Pt. passed mobility screening, ambulated 300 ft with O2 >90 on RA with good safety awareness and no LOB. Recommend home with assist at D/C.     Time Calculation:   Time Calculation- OT     Row Name 01/09/21 1230             Time Calculation- OT    OT Start Time  1038  -      Total Timed Code Minutes- OT  47 minute(s)  -      OT Received On  01/09/21  -      OT Goal Re-Cert Due Date  01/19/21  -         Timed Charges    28608 - OT Therapeutic Exercise Minutes  10  -      67584 - OT Therapeutic Activity Minutes  12  -      18243 - OT Self Care/Mgmt Minutes  25  -        User Key  (r) = Recorded By, (t) = Taken By, (c) = Cosigned By    Initials Name Provider Type    Reyna Casas OT Occupational Therapist         Therapy Charges for Today     Code Description Service Date Service Provider Modifiers Qty    41379808040 HC OT THER PROC EA 15 MIN 1/9/2021 Reyna Marino OT GO 1    69987496056 HC OT THERAPEUTIC ACT EA 15 MIN 1/9/2021 Reyna Marino OT GO 1    43637179758 HC OT SELF CARE/MGMT/TRAIN EA 15 MIN 1/9/2021 Reyna Marino OT GO 1    [id number]  OT EVAL LOW COMPLEXITY 4 1/9/2021 Reyna Marino OT GO 1    [id number]  OT THER SUPP EA 15 MIN 1/9/2021 Reyna Marino OT GO 3               Reyna Marino OT  1/9/2021

## 2021-01-10 NOTE — PROGRESS NOTES
ANGELINA Wan    Nerve Cath Post Op Call    Patient Name: Earnest Abarca  :  1950  MRN:  [id number]  Date of Discharge: 2021    Nerve Cath Post Op Call:    Analgesia:Fair  Side Effects:None  Catheter Site:clean  Patient Controlled ON Q pump infusion rate: 6ml/hr  Catheter Plan:Will continue with plan at home without changes and The patient was instructed to call ON CALL Anesthesia provider for any questions or problems  Patient/Family instructed to call ON CALL anesthesia provider for any questions or problems.  Patient Follow Up:

## 2021-01-11 NOTE — PROGRESS NOTES
ANGELINA Wan    Nerve Cath Post Op Call    Patient Name: Earnest Abarca  :  1950  MRN:  [id number]  Date of Discharge: 2021    Nerve Cath Post Op Call:    Analgesia:Good  Pain Score:2/10  Side Effects:None  Catheter Site:clean  Patient Controlled ON Q pump infusion rate: 6ml/hr  Catheter Plan:Will continue with plan at home without changes and The patient was instructed to call ON CALL Anesthesia provider for any questions or problems  Patient/Family instructed to call ON CALL anesthesia provider for any questions or problems.  Patient Follow Up:  Patient provided the educational video about the nerve catheter and pain pump.  Video adequately prepared patient to manage pain pump at home.

## 2021-01-12 NOTE — PROGRESS NOTES
ANGELINA Wan    Nerve Cath Post Op Call    Patient Name: Earnest Abarca  :  1950  MRN:  [id number]  Date of Discharge: 2021    Nerve Cath Post Op Call:    Analgesia:Good  Pain Score:2/10  Side Effects:None  Catheter Site:clean  Patient Controlled ON Q pump infusion rate: 4ml/hr  Catheter Plan:Will continue with plan at home without changes and The patient was instructed to call ON CALL Anesthesia provider for any questions or problems  Patient/Family instructed to call ON CALL anesthesia provider for any questions or problems.  Patient Follow Up:  Patient provided the educational video about the nerve catheter and pain pump.  Video adequately prepared patient to manage pain pump at home.  Tylenol use: acetaminophen use  NSAID use: no NSAID use

## 2021-01-13 NOTE — PROGRESS NOTES
ANGELINA Wan    Nerve Cath Post Op Call    Patient Name: Earnest Abarca  :  1950  MRN:  [id number]  Date of Discharge: 2021    Nerve Cath Post Op Call:    Catheter Plan:Patient/Family member report nerve catheter previously discontinued, tip intact  Patient/Family instructed to call ON CALL anesthesia provider for any questions or problems.  Patient Follow Up:

## 2021-11-15 ENCOUNTER — HOSPITAL ENCOUNTER (OUTPATIENT)
Age: 71
End: 2021-11-15
Payer: MEDICARE

## 2021-11-15 DIAGNOSIS — R53.83: Primary | ICD-10-CM

## 2021-11-15 DIAGNOSIS — E03.9: ICD-10-CM

## 2021-11-15 LAB
ALBUMIN LEVEL: 4.4 G/DL (ref 3.5–5)
ALBUMIN/GLOB SERPL: 1.3 {RATIO} (ref 1.1–1.8)
ALP ISO SERPL-ACNC: 113 U/L (ref 38–126)
ALT SERPLBLD-CCNC: 27 U/L (ref 12–78)
ANION GAP SERPL CALC-SCNC: 12.7 MEQ/L (ref 5–15)
AST SERPL QL: 31 U/L (ref 17–59)
BILIRUBIN,TOTAL: 0.5 MG/DL (ref 0.2–1.3)
BUN SERPL-MCNC: 17 MG/DL (ref 9–20)
CALCIUM SPEC-MCNC: 9.3 MG/DL (ref 8.4–10.2)
CHLORIDE SPEC-SCNC: 101 MMOL/L (ref 98–107)
CHOLEST SPEC-SCNC: 124 MG/DL (ref 140–200)
CO2 SERPL-SCNC: 28 MMOL/L (ref 22–30)
CREAT BLD-SCNC: 0.9 MG/DL (ref 0.66–1.25)
ESTIMATED GLOMERULAR FILT RATE: 83 ML/MIN (ref 60–?)
GFR (AFRICAN AMERICAN): 101 ML/MIN (ref 60–?)
GLOBULIN SER CALC-MCNC: 3.3 G/DL (ref 1.3–3.2)
GLUCOSE: 91 MG/DL (ref 74–100)
HCT VFR BLD CALC: 45 % (ref 42–52)
HDLC SERPL-MCNC: 29 MG/DL (ref 40–60)
HGB BLD-MCNC: 15 G/DL (ref 14.1–18)
MCHC RBC-ENTMCNC: 33.3 G/DL (ref 31.8–35.4)
MCV RBC: 91.4 FL (ref 80–94)
MEAN CORPUSCULAR HEMOGLOBIN: 30.5 PG (ref 27–31.2)
PLATELET # BLD: 335 K/MM3 (ref 142–424)
POTASSIUM: 4.7 MMOL/L (ref 3.5–5.1)
PROT SERPL-MCNC: 7.7 G/DL (ref 6.3–8.2)
RBC # BLD AUTO: 4.92 M/MM3 (ref 4.6–6.2)
SODIUM SPEC-SCNC: 137 MMOL/L (ref 136–145)
T4 FREE SERPL-MCNC: 1.15 NG/DL (ref 0.78–2.19)
TRIGLYCERIDES: 143 MG/DL (ref 30–150)
TSH SERPL-ACNC: 2.2 UIU/ML (ref 0.47–4.68)
WBC # BLD AUTO: 9.6 K/MM3 (ref 4.8–10.8)

## 2021-11-15 PROCEDURE — 80053 COMPREHEN METABOLIC PANEL: CPT

## 2021-11-15 PROCEDURE — 80061 LIPID PANEL: CPT

## 2021-11-15 PROCEDURE — 84443 ASSAY THYROID STIM HORMONE: CPT

## 2021-11-15 PROCEDURE — 84439 ASSAY OF FREE THYROXINE: CPT

## 2021-11-15 PROCEDURE — 85025 COMPLETE CBC W/AUTO DIFF WBC: CPT

## 2022-11-17 ENCOUNTER — HOSPITAL ENCOUNTER (OUTPATIENT)
Age: 72
End: 2022-11-17
Payer: MEDICARE

## 2022-11-17 DIAGNOSIS — R05.9: ICD-10-CM

## 2022-11-17 DIAGNOSIS — I10: ICD-10-CM

## 2022-11-17 DIAGNOSIS — E55.9: ICD-10-CM

## 2022-11-17 DIAGNOSIS — Z12.5: ICD-10-CM

## 2022-11-17 DIAGNOSIS — I50.9: Primary | ICD-10-CM

## 2022-11-17 LAB
25-OH VITAMIN D, TOTAL: 32 NG/ML (ref 30–100)
ALBUMIN LEVEL: 4.5 G/DL (ref 3.5–5)
ALBUMIN/GLOB SERPL: 1.6 {RATIO} (ref 1.1–1.8)
ALP ISO SERPL-ACNC: 128 U/L (ref 38–126)
ALT SERPLBLD-CCNC: 26 U/L (ref 12–78)
ANION GAP SERPL CALC-SCNC: 18.6 MEQ/L (ref 5–15)
AST SERPL QL: 27 U/L (ref 17–59)
BILIRUBIN,TOTAL: 0.5 MG/DL (ref 0.2–1.3)
BUN SERPL-MCNC: 23 MG/DL (ref 9–20)
CALCIUM SPEC-MCNC: 9.9 MG/DL (ref 8.4–10.2)
CHLORIDE SPEC-SCNC: 98 MMOL/L (ref 98–107)
CHOLEST SPEC-SCNC: 182 MG/DL (ref 140–200)
CO2 SERPL-SCNC: 28 MMOL/L (ref 22–30)
CREAT BLD-SCNC: 1.1 MG/DL (ref 0.66–1.25)
ESTIMATED GLOMERULAR FILT RATE: 66 ML/MIN (ref 60–?)
GFR (AFRICAN AMERICAN): 80 ML/MIN (ref 60–?)
GLOBULIN SER CALC-MCNC: 2.9 G/DL (ref 1.3–3.2)
GLUCOSE: 112 MG/DL (ref 74–100)
HCT VFR BLD CALC: 46.6 % (ref 42–52)
HDLC SERPL-MCNC: 29 MG/DL (ref 40–60)
HGB BLD-MCNC: 15 G/DL (ref 14.1–18)
MCHC RBC-ENTMCNC: 32.2 G/DL (ref 31.8–35.4)
MCV RBC: 92.1 FL (ref 80–94)
MEAN CORPUSCULAR HEMOGLOBIN: 29.6 PG (ref 27–31.2)
PLATELET # BLD: 329 K/MM3 (ref 142–424)
POTASSIUM: 4.6 MMOL/L (ref 3.5–5.1)
PROT SERPL-MCNC: 7.4 G/DL (ref 6.3–8.2)
RBC # BLD AUTO: 5.06 M/MM3 (ref 4.6–6.2)
SODIUM SPEC-SCNC: 140 MMOL/L (ref 136–145)
TRIGLYCERIDES: 250 MG/DL (ref 30–150)
TSH SERPL-ACNC: 1.8 UIU/ML (ref 0.47–4.68)
WBC # BLD AUTO: 12.7 K/MM3 (ref 4.8–10.8)

## 2022-11-17 PROCEDURE — 85025 COMPLETE CBC W/AUTO DIFF WBC: CPT

## 2022-11-17 PROCEDURE — G0103 PSA SCREENING: HCPCS

## 2022-11-17 PROCEDURE — 80053 COMPREHEN METABOLIC PANEL: CPT

## 2022-11-17 PROCEDURE — 84443 ASSAY THYROID STIM HORMONE: CPT

## 2022-11-17 PROCEDURE — 80061 LIPID PANEL: CPT

## 2022-11-17 PROCEDURE — 82306 VITAMIN D 25 HYDROXY: CPT

## 2023-10-25 ENCOUNTER — HOSPITAL ENCOUNTER (OUTPATIENT)
Age: 73
End: 2023-10-25
Payer: MEDICARE

## 2023-10-25 DIAGNOSIS — R73.9: ICD-10-CM

## 2023-10-25 DIAGNOSIS — I11.0: Primary | ICD-10-CM

## 2023-10-25 DIAGNOSIS — E55.9: ICD-10-CM

## 2023-10-25 DIAGNOSIS — I50.9: ICD-10-CM

## 2023-10-25 LAB
25-OH VITAMIN D, TOTAL: 25.9 NG/ML (ref 30–100)
ALBUMIN LEVEL: 4.3 G/DL (ref 3.5–5)
ALBUMIN/GLOB SERPL: 1.5 {RATIO} (ref 1.1–1.8)
ALP ISO SERPL-ACNC: 106 U/L (ref 38–126)
ALT SERPLBLD-CCNC: 34 U/L (ref 12–78)
ANION GAP SERPL CALC-SCNC: 16.7 MEQ/L (ref 5–15)
AST SERPL QL: 31 U/L (ref 17–59)
BILIRUBIN,TOTAL: 0.6 MG/DL (ref 0.2–1.3)
BUN SERPL-MCNC: 20 MG/DL (ref 9–20)
CALCIUM SPEC-MCNC: 9.2 MG/DL (ref 8.4–10.2)
CHLORIDE SPEC-SCNC: 100 MMOL/L (ref 98–107)
CHOLEST SPEC-SCNC: 165 MG/DL (ref 140–200)
CO2 SERPL-SCNC: 24 MMOL/L (ref 22–30)
CREAT BLD-SCNC: 0.9 MG/DL (ref 0.66–1.25)
ESTIMATED GLOMERULAR FILT RATE: 83 ML/MIN (ref 60–?)
GFR (AFRICAN AMERICAN): 100 ML/MIN (ref 60–?)
GLOBULIN SER CALC-MCNC: 2.8 G/DL (ref 1.3–3.2)
GLUCOSE: 111 MG/DL (ref 74–100)
HCT VFR BLD CALC: 45.6 % (ref 42–52)
HDLC SERPL-MCNC: 29 MG/DL (ref 40–60)
HGB BLD-MCNC: 15.5 G/DL (ref 14.1–18)
MCHC RBC-ENTMCNC: 33.9 G/DL (ref 31.8–35.4)
MCV RBC: 90.5 FL (ref 80–94)
MEAN CORPUSCULAR HEMOGLOBIN: 30.7 PG (ref 27–31.2)
PLATELET # BLD: 243 K/MM3 (ref 142–424)
POTASSIUM: 4.7 MMOL/L (ref 3.5–5.1)
PROT SERPL-MCNC: 7.1 G/DL (ref 6.3–8.2)
RBC # BLD AUTO: 5.04 M/MM3 (ref 4.6–6.2)
SODIUM SPEC-SCNC: 136 MMOL/L (ref 136–145)
T4 (THYROXINE): 7.3 UG/DL (ref 5.53–11)
TRIGLYCERIDES: 200 MG/DL (ref 30–150)
TSH SERPL-ACNC: 1.8 UIU/ML (ref 0.47–4.68)
WBC # BLD AUTO: 9.1 K/MM3 (ref 4.8–10.8)

## 2023-10-25 PROCEDURE — 85025 COMPLETE CBC W/AUTO DIFF WBC: CPT

## 2023-10-25 PROCEDURE — 83036 HEMOGLOBIN GLYCOSYLATED A1C: CPT

## 2023-10-25 PROCEDURE — 82306 VITAMIN D 25 HYDROXY: CPT

## 2023-10-25 PROCEDURE — 84443 ASSAY THYROID STIM HORMONE: CPT

## 2023-10-25 PROCEDURE — 80061 LIPID PANEL: CPT

## 2023-10-25 PROCEDURE — 84436 ASSAY OF TOTAL THYROXINE: CPT

## 2023-10-25 PROCEDURE — 80053 COMPREHEN METABOLIC PANEL: CPT

## 2023-10-26 LAB — HBA1C MFR BLD: 6.5 % (ref 4–6)

## 2024-09-03 ENCOUNTER — HOSPITAL ENCOUNTER (OUTPATIENT)
Dept: HOSPITAL 22 - LAB | Age: 74
Discharge: HOME | End: 2024-09-03
Payer: MEDICARE

## 2024-09-03 DIAGNOSIS — I10: ICD-10-CM

## 2024-09-03 DIAGNOSIS — Z12.5: ICD-10-CM

## 2024-09-03 DIAGNOSIS — R73.9: ICD-10-CM

## 2024-09-03 DIAGNOSIS — M79.661: ICD-10-CM

## 2024-09-03 DIAGNOSIS — E78.5: ICD-10-CM

## 2024-09-03 DIAGNOSIS — E55.9: ICD-10-CM

## 2024-09-03 DIAGNOSIS — R53.83: ICD-10-CM

## 2024-09-03 LAB
25-OH VITAMIN D, TOTAL: 61.3 NG/ML (ref 30–100)
ALBUMIN LEVEL: 4 G/DL (ref 3.5–5)
ALBUMIN/GLOB SERPL: 1.4 {RATIO} (ref 1.1–1.8)
ALP ISO SERPL-ACNC: 94 U/L (ref 38–126)
ALT SERPLBLD-CCNC: 70 U/L (ref 12–78)
ANION GAP SERPL CALC-SCNC: 12.2 MEQ/L (ref 5–15)
AST SERPL QL: 136 U/L (ref 17–59)
BILIRUBIN,TOTAL: 0.7 MG/DL (ref 0.2–1.3)
BUN SERPL-MCNC: 18 MG/DL (ref 9–20)
CALCIUM SPEC-MCNC: 9 MG/DL (ref 8.4–10.2)
CHLORIDE SPEC-SCNC: 105 MMOL/L (ref 98–107)
CHOLEST SPEC-SCNC: 159 MG/DL (ref 140–200)
CO2 SERPL-SCNC: 23 MMOL/L (ref 22–30)
CREAT BLD-SCNC: 0.8 MG/DL (ref 0.66–1.25)
ESTIMATED GLOMERULAR FILT RATE: 95 ML/MIN (ref 60–?)
GFR (AFRICAN AMERICAN): 115 ML/MIN (ref 60–?)
GLOBULIN SER CALC-MCNC: 2.8 G/DL (ref 1.3–3.2)
GLUCOSE: 98 MG/DL (ref 74–100)
HCT VFR BLD CALC: 44.6 % (ref 42–52)
HDLC SERPL-MCNC: 27 MG/DL (ref 40–60)
HGB BLD-MCNC: 14.4 G/DL (ref 14.1–18)
MCHC RBC-ENTMCNC: 32.2 G/DL (ref 31.8–35.4)
MCV RBC: 94.5 FL (ref 80–94)
MEAN CORPUSCULAR HEMOGLOBIN: 30.4 PG (ref 27–31.2)
PLATELET # BLD: 256 K/MM3 (ref 142–424)
POTASSIUM: 4.2 MMOL/L (ref 3.5–5.1)
PROT SERPL-MCNC: 6.8 G/DL (ref 6.3–8.2)
RBC # BLD AUTO: 4.72 M/MM3 (ref 4.6–6.2)
SODIUM SPEC-SCNC: 136 MMOL/L (ref 136–145)
TRIGLYCERIDES: 216 MG/DL (ref 30–150)
TSH SERPL-ACNC: 1.82 UIU/ML (ref 0.47–4.68)
WBC # BLD AUTO: 8.4 K/MM3 (ref 4.8–10.8)

## 2024-09-03 PROCEDURE — G0103 PSA SCREENING: HCPCS

## 2024-09-03 PROCEDURE — 84443 ASSAY THYROID STIM HORMONE: CPT

## 2024-09-03 PROCEDURE — 82306 VITAMIN D 25 HYDROXY: CPT

## 2024-09-03 PROCEDURE — 85025 COMPLETE CBC W/AUTO DIFF WBC: CPT

## 2024-09-03 PROCEDURE — 80061 LIPID PANEL: CPT

## 2024-09-03 PROCEDURE — 80053 COMPREHEN METABOLIC PANEL: CPT

## 2024-09-03 PROCEDURE — 93971 EXTREMITY STUDY: CPT

## 2024-09-03 NOTE — CA_ITS
FINAL REPORT 
 
TECHNIQUE: 
Color Doppler, duplex Doppler and compression sonography of the 
right lower extremity venous system was performed. 
 
CLINICAL HISTORY: 
PT C/O LEG CRAMPS RLE,PAIN RT ANKLE/CALF,PT ON ASA 
 
FINDINGS: 
There is no evidence of deep venous thrombosis from the level of 
the groin to the calf.  The veins are patent and compressible. 
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 
significant mural thrombus. 
 
IMPRESSION: 
No evidence of deep venous thrombosis right lower extremity. 
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 
thrombus. 
 
Reviewed, Interpreted and Dictated by Aramis Almonte III, MD 
Transcribed by Shruthi Li 
 
Authenticated and Electronically Signed by Aramis Almonte III, MD on 09/03/2024 04:33:24 PM [facility]

## 2025-06-13 ENCOUNTER — HOSPITAL ENCOUNTER (OUTPATIENT)
Facility: HOSPITAL | Age: 75
Setting detail: HOSPITAL OUTPATIENT SURGERY
Discharge: HOME | End: 2025-06-13
Attending: SURGERY | Admitting: SURGERY
Payer: MEDICARE

## 2025-06-13 VITALS
RESPIRATION RATE: 20 BRPM | SYSTOLIC BLOOD PRESSURE: 120 MMHG | HEIGHT: 70 IN | TEMPERATURE: 97.1 F | HEART RATE: 70 BPM | DIASTOLIC BLOOD PRESSURE: 72 MMHG | OXYGEN SATURATION: 91 % | WEIGHT: 287.8 LBS | BODY MASS INDEX: 41.2 KG/M2

## 2025-06-13 LAB
ANION GAP SERPL CALCULATED.3IONS-SCNC: 12 MMOL/L (ref 5–15)
BUN SERPL-MCNC: 18.2 MG/DL (ref 8–23)
BUN/CREAT SERPL: 16.5 (ref 7–25)
CALCIUM SPEC-SCNC: 9 MG/DL (ref 8.6–10.5)
CHLORIDE SERPL-SCNC: 101 MMOL/L (ref 98–107)
CO2 SERPL-SCNC: 24 MMOL/L (ref 22–29)
CREAT SERPL-MCNC: 1.1 MG/DL (ref 0.76–1.27)
DEPRECATED RDW RBC AUTO: 50.4 FL (ref 37–54)
EGFRCR SERPLBLD CKD-EPI 2021: 70.9 ML/MIN/1.73
ERYTHROCYTE [DISTWIDTH] IN BLOOD BY AUTOMATED COUNT: 15.2 % (ref 12.3–15.4)
GLUCOSE SERPL-MCNC: 138 MG/DL (ref 65–99)
HCT VFR BLD AUTO: 44.9 % (ref 37.5–51)
HGB BLD-MCNC: 14.8 G/DL (ref 13–17.7)
MCH RBC QN AUTO: 29.5 PG (ref 26.6–33)
MCHC RBC AUTO-ENTMCNC: 33 G/DL (ref 31.5–35.7)
MCV RBC AUTO: 89.4 FL (ref 79–97)
PLATELET # BLD AUTO: 265 10*3/MM3 (ref 140–450)
PMV BLD AUTO: 9.8 FL (ref 6–12)
POTASSIUM SERPL-SCNC: 4.5 MMOL/L (ref 3.5–5.2)
RBC # BLD AUTO: 5.02 10*6/MM3 (ref 4.14–5.8)
SODIUM SERPL-SCNC: 137 MMOL/L (ref 136–145)
WBC NRBC COR # BLD AUTO: 8.29 10*3/MM3 (ref 3.4–10.8)

## 2025-06-13 PROCEDURE — C1887 CATHETER, GUIDING: HCPCS | Performed by: SURGERY

## 2025-06-13 PROCEDURE — C1874 STENT, COATED/COV W/DEL SYS: HCPCS | Performed by: SURGERY

## 2025-06-13 PROCEDURE — C1725 CATH, TRANSLUMIN NON-LASER: HCPCS | Performed by: SURGERY

## 2025-06-13 PROCEDURE — C1769 GUIDE WIRE: HCPCS | Performed by: SURGERY

## 2025-06-13 PROCEDURE — C1894 INTRO/SHEATH, NON-LASER: HCPCS | Performed by: SURGERY

## 2025-06-13 PROCEDURE — 25010000002 FENTANYL CITRATE (PF) 50 MCG/ML SOLUTION: Performed by: SURGERY

## 2025-06-13 PROCEDURE — 25010000002 LIDOCAINE PF 1% 1 % SOLUTION: Performed by: SURGERY

## 2025-06-13 PROCEDURE — 25810000003 SODIUM CHLORIDE 0.9 % SOLUTION: Performed by: SURGERY

## 2025-06-13 PROCEDURE — 25010000002 MIDAZOLAM PER 1 MG: Performed by: SURGERY

## 2025-06-13 PROCEDURE — 25510000002 IODIXANOL PER 1 ML: Performed by: SURGERY

## 2025-06-13 PROCEDURE — 36415 COLL VENOUS BLD VENIPUNCTURE: CPT

## 2025-06-13 PROCEDURE — C2623 CATH, TRANSLUMIN, DRUG-COAT: HCPCS | Performed by: SURGERY

## 2025-06-13 PROCEDURE — 25010000002 HEPARIN (PORCINE) PER 1000 UNITS: Performed by: SURGERY

## 2025-06-13 PROCEDURE — 80048 BASIC METABOLIC PNL TOTAL CA: CPT | Performed by: SURGERY

## 2025-06-13 PROCEDURE — C1714 CATH, TRANS ATHERECTOMY, DIR: HCPCS | Performed by: SURGERY

## 2025-06-13 PROCEDURE — C1760 CLOSURE DEV, VASC: HCPCS | Performed by: SURGERY

## 2025-06-13 PROCEDURE — 85027 COMPLETE CBC AUTOMATED: CPT | Performed by: SURGERY

## 2025-06-13 PROCEDURE — C1884 EMBOLIZATION PROTECT SYST: HCPCS | Performed by: SURGERY

## 2025-06-13 DEVICE — IMPLANTABLE DEVICE: Type: IMPLANTABLE DEVICE | Status: FUNCTIONAL

## 2025-06-13 RX ORDER — HEPARIN SODIUM 1000 [USP'U]/ML
INJECTION, SOLUTION INTRAVENOUS; SUBCUTANEOUS
Status: DISCONTINUED | OUTPATIENT
Start: 2025-06-13 | End: 2025-06-13 | Stop reason: HOSPADM

## 2025-06-13 RX ORDER — MORPHINE SULFATE 4 MG/ML
4 INJECTION, SOLUTION INTRAMUSCULAR; INTRAVENOUS
Status: DISCONTINUED | OUTPATIENT
Start: 2025-06-13 | End: 2025-06-13 | Stop reason: HOSPADM

## 2025-06-13 RX ORDER — LIDOCAINE HYDROCHLORIDE 10 MG/ML
INJECTION, SOLUTION EPIDURAL; INFILTRATION; INTRACAUDAL; PERINEURAL
Status: DISCONTINUED | OUTPATIENT
Start: 2025-06-13 | End: 2025-06-13 | Stop reason: HOSPADM

## 2025-06-13 RX ORDER — CLOPIDOGREL BISULFATE 75 MG/1
75 TABLET ORAL DAILY
Qty: 30 TABLET | Refills: 11 | Status: SHIPPED | OUTPATIENT
Start: 2025-06-13 | End: 2026-06-13

## 2025-06-13 RX ORDER — FENTANYL CITRATE 50 UG/ML
INJECTION, SOLUTION INTRAMUSCULAR; INTRAVENOUS
Status: DISCONTINUED | OUTPATIENT
Start: 2025-06-13 | End: 2025-06-13 | Stop reason: HOSPADM

## 2025-06-13 RX ORDER — IODIXANOL 320 MG/ML
INJECTION, SOLUTION INTRAVASCULAR
Status: DISCONTINUED | OUTPATIENT
Start: 2025-06-13 | End: 2025-06-13 | Stop reason: HOSPADM

## 2025-06-13 RX ORDER — HYDRALAZINE HYDROCHLORIDE 20 MG/ML
10 INJECTION INTRAMUSCULAR; INTRAVENOUS EVERY 6 HOURS PRN
Status: DISCONTINUED | OUTPATIENT
Start: 2025-06-13 | End: 2025-06-13 | Stop reason: HOSPADM

## 2025-06-13 RX ORDER — MIDAZOLAM HYDROCHLORIDE 1 MG/ML
INJECTION, SOLUTION INTRAMUSCULAR; INTRAVENOUS
Status: DISCONTINUED | OUTPATIENT
Start: 2025-06-13 | End: 2025-06-13 | Stop reason: HOSPADM

## 2025-06-13 RX ORDER — SODIUM CHLORIDE 9 MG/ML
INJECTION, SOLUTION INTRAVENOUS
Status: COMPLETED | OUTPATIENT
Start: 2025-06-13 | End: 2025-06-13

## 2025-06-13 NOTE — OP NOTE
CV PERIPHERAL ANGIOGRAPHY  Procedure Report    Patient Name:  Earnest Abarca  YOB: 1950    Date of Surgery:  6/13/2025     Indications:  74 year old  gentleman referred by Dr. Eulogio Lorenzana, a patient of Verena Boucher PA-C, who developed RIGHT lower extremity pain in September '24.  He underwent ultrasound at [facility] which reportedly demonstrated a RIGHT popliteal aneurysm.  He was then referred to Dr. Lorenzana and a CTA was performed 4/2/25 which demonstrates severe stenosis of the RIGHT popliteal artery.  He had no claudication symptoms until he travelled to the [place] for scuba diving which was especially noticeable when he transferred at [facility]!  He states that walking through [place] now gives him issues.  His 5/27/25 CTA demonstrated thrombosis of a 4cm RIGHT popliteal artery aneurysm with reconstitution of the BKP.    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  RIGHT AT stenosis - 80% proximal  RIGHT Pr occlusion - mid    Procedure/CPT® Codes:  CHG ANGIOGRAPHY EXTREMITY UNILATERAL RS&I [89412]  CHG ANGIOGRAPHY EXTREMITY BILATERAL RS&I [04263]    Procedure(s):  LEFT CFA access - ultrasound guided  Aortogram with RIGHT lower extremity run-off  RIGHT Pop angioplasty (9x80mm [place])  RIGHT Pop atherectomy (HawkOne LS)  RIGHT Pop stent ([id number] Viabahn proximal, [id number] Viabahn distal)  RIGHT AT angioplasty (4x40mm InPACT)  RIGHT TPT angioplasty (3y418th Nanocross)  RIGHT Pr angioplasty (0s185gm Nanocross)  LEFT CFA closure (Angioseal)    Staff:  Surgeon(s):  Rob Ferris MD    Scrub Person: Linette Durand  Documenter: Asha Valdes RN  Invasive Nurse: Dave Bales RN; Ely Benitez RN  Invasive Technologist: Niko Valdivia                 Anesthesia: Local    Estimated Blood Loss: minimal    Implants:    Implant Name Type Inv. Item Serial No.  Lot No. LRB  No. Used Action   STENTGR ENDOPROSTH VIABAHN RO HEP 8F 9MM [id number] - [id number] - [id number] Implant STENTGR ENDOPROSTH VIABAHN RO HEP 8F 9MM [id number] [employer]  N/A 1 Implanted   STENTGR ENDOPROSTH VIABAHN RO HEP 8F 9MM [id number] - [id number] - [id number] Implant STENTGR ENDOPROSTH VIABAHN RO HEP 8F 9MM [id number] [employer]  N/A 1 Implanted       Specimen:          None    Findings: With the catheter in the distal abdominal aorta, flow was seen BILATERALLY into the iliac arteries.     On the LEFT, the common (JESSIE) iliac artery is patent and continues to the iliac bifurcation with visualization of the internal (IIA) and external (EIA) iliac arteries.  Beyond this point, the vasculature was not specifically interrogated.     On the RIGHT, the JESSIE is patent and continues to the iliac bifurcation with visualization of the IIA and EIA arteries.  Flow continues into the common (CFA) femoral artery which continues to the femoral bifurcation with visualization of both the profunda (PFA) and superficial (SFA) femoral arteries which are widely patent.  Flow continues into the above-knee popliteal (AKP) which occludes in the mid portion with several geniculate collaterals seen with late reconstitution of the below-knee popliteal (BKP) without full visualization of the trifurcation.  The anterior tibial (AT) has a 90% proximal stenosis then occludes in the distal aspect.  The tibioperoneal trunk (TPT) continues into the posterior tibial (PT) and peroneal (Pr).  The PT is the dominant run-off of the leg while the Pr occludes in the mid leg.  The dorsalis pedis (DP) is occluded with dominant run-off via the lateral plantar.    Complications: None    Description of Procedure:  The patient was seen in the preoperative area. The site of surgery was properly noted/marked if necessary per policy. The patient has been actively warmed in preoperative area. Preoperative antibiotics have been ordered and  given within 0.5 hours of incision. Venous thrombosis prophylaxis are not indicated.    Patient was lying supine on the cath lab table when he was gently sedated.  The BILATERAL groins were then prepped and draped in usual sterile fashion.  The patient was identified as Earnest Abarca per timeout protocol.    The LEFT common femoral artery was then visualized by ultrasound and accessed in retrograde fashion using micropuncture technique.  The 6 Malaysian sheath was installed and the 035/260 glide advantage wire (GAW) was then advanced into the abdominal aorta and the flush catheter was then used to obtain an aortogram.  The wire and catheter were directed up and over into the RIGHT common femoral artery and additional runoff views were then obtained of the RIGHT lower extremity.  The 6 Malaysian sheath was then exchanged for a 8Fr/45 cm destination sheath which was positioned in the RIGHT common femoral artery.  The patient was then systemically heparinized with 10,000 units of heparin.  The wire and trailblazer catheter were then directed into the thrombosed RIGHT popliteal artery aneurysm and emerged in the below-knee popliteal which was confirmed by arteriogram.  Next, the 9mm balloon was advanced into the below-knee popliteal for sizing and angioplasty.  The 7mm spider wire was then deployed in the posterior tibial which was the dominant run-off.  The 9mm self-expanding covered stent was then advanced, but could not cross the distal above-knee popliteal necessitating directional atherectomy which was performed using the Sakti3 LS.  The first 9mm stent was then deployed spanning from the distal superficial femoral artery to the proximal below-knee popliteal and a second 9mm stent was then deployed spanning from the mid above-knee popliteal to the distal below-knee popliteal and post dilated with the 9mm balloon.  The wire was then directed into the anterior tibial and the 4mm drug-coated balloon was used to angioplasty  the anterior tibial origin/proximal region.  The wire was then directed distally into the anterior tibial, but could not cross the distal anterior tibial which is occluded.  The wire and balloon were then withdrawn and redirected into the peroneal which courses past the ankle and to the level of the dorsalis pedis which could not be entered.  Completion arteriogram now demonstrates patent flow through the RIGHT popliteal artery stents with preservation of the superior most geniculate collaterals, improved flow and visualization of the anterior tibial which is seen to the mid leg and preserved posterior tibial run-off.  The sheath was exchanged over the wire for an Angioseal.    Dispo:  Stable to [facility] for post-femoral access management.  Operative findings discussed with patient's wife.  F/U 2 weeks with YANNA.    Rob Ferris MD     Date: 6/13/2025  Time: 13:17 EDT

## 2025-06-13 NOTE — H&P
Patient Care Team:  Verena Boucher PA as PCP - General (Physician Assistant)  Jonas Onofre MD as Consulting Physician (Cardiology)    Chief complaint: RLE popliteal artery aneurysm    Subjective     History of Present Illness   74  gentleman referred by Dr. Eulogio Lorenzana, a patient of Verena Boucher PA-C, who developed RIGHT lower extremity pain in September '24.  He underwent ultrasound at [facility] which reportedly demonstrated a RIGHT popliteal aneurysm.  He was then referred to Dr. Lorenzana and a CTA was performed 4/2/25 which demonstrates severe stenosis of the RIGHT popliteal artery.  He had no claudication symptoms until he travelled to the [place] for scuba diving which was especially noticeable when he transferred at [facility]!  He states that walking through [place] now gives him issues.  He returns for discussion after his 5/27/25 CTA.      Review of Systems   All other systems reviewed and are negative.       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 No pertinent family history.,   Social History     Socioeconomic History    Marital status:    Tobacco Use    Smoking status: Never     Smokeless tobacco: Never   Vaping Use    Vaping status: Never Used   Substance and Sexual Activity    Alcohol use: Not Currently     Comment: occasionally     Drug use: Not Currently    Sexual activity: Defer     E-cigarette/Vaping    E-cigarette/Vaping Use Never User     Passive Exposure No      E-cigarette/Vaping Substances     E-cigarette/Vaping Devices         ,   Medications Prior to Admission   Medication Sig Dispense Refill Last Dose/Taking    ASPIRIN 81 PO Take 81 mg by mouth Every Morning. Did not stop for surgery per cardiologist   6/12/2025    atorvastatin (LIPITOR) 20 MG tablet Take 1 tablet by mouth Every Night.   Taking    bumetanide (BUMEX) 2 MG tablet Take 1 tablet by mouth Every Morning.   Taking    diphenhydrAMINE (BENADRYL) 50 MG capsule Take 1 capsule by mouth At Night As Needed.   Taking As Needed    metoprolol succinate XL (TOPROL-XL) 50 MG 24 hr tablet Take 1 tablet by mouth Every Night.   6/12/2025    nitroglycerin (NITROSTAT) 0.4 MG SL tablet Place 1 tablet under the tongue Every 5 (Five) Minutes As Needed for Chest Pain. Never used   Taking As Needed    sacubitril-valsartan (Entresto)  MG tablet Take 1 tablet by mouth 2 (Two) Times a Day.   6/13/2025 Morning   , and Allergies:  Bee venom    Objective      Vital Signs  Temp:  [97.1 °F (36.2 °C)] 97.1 °F (36.2 °C)  Heart Rate:  [78-80] 80  Resp:  [18] 18  BP: (122-128)/(68-75) 128/68    Physical Exam  Vitals reviewed.   Constitutional:       General: He is not in acute distress.     Appearance: Normal appearance. He is obese. He is not ill-appearing, toxic-appearing or diaphoretic.   HENT:      Head: Normocephalic and atraumatic.      Nose: Nose normal.      Mouth/Throat:      Mouth: Mucous membranes are moist.      Pharynx: Oropharynx is clear.   Eyes:      Extraocular Movements: Extraocular movements intact.      Conjunctiva/sclera: Conjunctivae normal.      Pupils: Pupils are equal, round, and reactive to light.   Cardiovascular:       Rate and Rhythm: Normal rate and regular rhythm.      Comments: Absent RIGHT DP/PT pulse  Pulmonary:      Effort: Pulmonary effort is normal. No respiratory distress.   Abdominal:      General: Abdomen is flat.      Palpations: Abdomen is soft.   Musculoskeletal:      Cervical back: Normal range of motion and neck supple.   Skin:     General: Skin is warm and dry.      Capillary Refill: Capillary refill takes 2 to 3 seconds.   Neurological:      General: No focal deficit present.      Mental Status: He is alert and oriented to person, place, and time.   Psychiatric:         Mood and Affect: Mood normal.         Behavior: Behavior normal.         Results Review:    I reviewed the patient's new clinical results.  04/02/25 CTA AORO RIGHT popliteal severe stenosis ([facility])  05/21/25 YANNA RIGHT 0.47, LEFT 1.15  05/21/25 RLE arterial duplex RIGHT AKP >70%, trickle flow distally, Pr 100%, Denisse 100%  05/27/25 CTA AORO RIGHT AKP occlusion, 4cm RAEANN, reconstitution BKP ([facility])        Assessment & Plan   RIGHT popliteal artery aneurysm thrombosis      Assessment:   (Elective RLE intervention possible popliteal artery aneurysm stenting).       I discussed the patients findings and my recommendations with patient    Rob Ferris MD  06/13/25  08:33 EDT

## (undated) DEVICE — DRSNG SURG AQUACEL AG 9X25CM

## (undated) DEVICE — DRVR HAWK1 CUT

## (undated) DEVICE — CATH OMNI FLUSH 5FR

## (undated) DEVICE — ST PIN FIX TEMP UNIVERSREVERS W/BRKAWAY/GUIDE/PIN 2.4MM STRL

## (undated) DEVICE — 3M™ IOBAN™ 2 ANTIMICROBIAL INCISE DRAPE 6651EZ: Brand: IOBAN™ 2

## (undated) DEVICE — 3M™ STERI-DRAPE™ U-DRAPE 1015: Brand: STERI-DRAPE™

## (undated) DEVICE — OSCILLATING TIP SAW CARTRIDGE: Brand: PRECISION FALCON

## (undated) DEVICE — GLV SURG SENSICARE PI ORTHO SZ7.5 LF STRL

## (undated) DEVICE — PGW .018 SV SHORT 300CM ST: Brand: SV WIRE

## (undated) DEVICE — SLNG ULTRSLING3 13TO15IN LG

## (undated) DEVICE — DEV INFL MONARCH 25W

## (undated) DEVICE — SWABSTK SKINPREP PVPI PRE/SAT 8IN STRL

## (undated) DEVICE — PK MAJ SHLDR SPLT 10

## (undated) DEVICE — ISO/ALC 70PCT 16OZ

## (undated) DEVICE — SYR SLP TP 10ML DISP

## (undated) DEVICE — GLV SURG PREMIERPRO MIC LTX PF SZ6.5 BRN

## (undated) DEVICE — T-MAX DISPOSABLE FACE MASK 8 PER BOX

## (undated) DEVICE — DRAPE,REIN 53X77,STERILE: Brand: MEDLINE

## (undated) DEVICE — ATHERECTOMY H1-LS HAWKONE 7F STD TIP US: Brand: HAWKONE™

## (undated) DEVICE — ANGIO-SEAL VIP VASCULAR CLOSURE DEVICE: Brand: ANGIO-SEAL

## (undated) DEVICE — DEV EPS SPIDERFX 7MM OTW320/RX190CM

## (undated) DEVICE — LIMB HOLDER, WRIST/ANKLE: Brand: DEROYAL

## (undated) DEVICE — CVR PROB ULTRASND/TRANSD W/GEL 7X11IN STRL

## (undated) DEVICE — RADIFOCUS GLIDEWIRE ADVANTAGE TRACK GUIDE WIRE: Brand: GLIDEWIRE ADVANTAGE TRACK

## (undated) DEVICE — DESTINATION PERIPHERAL GUIDING SHEATH: Brand: DESTINATION

## (undated) DEVICE — SUT ETHLN 3/0 PC5 18IN 1893G

## (undated) DEVICE — UNDERGLV SURG BIOGEL INDICAT PF 61/2 GRN

## (undated) DEVICE — SPNG GZ WOVN 4X4IN 12PLY 10/BX STRL

## (undated) DEVICE — BIT DRL UNIVERS REVERS MODULAR 3MM

## (undated) DEVICE — Device

## (undated) DEVICE — 450 ML BOTTLE OF 0.05% CHLORHEXIDINE GLUCONATE IN 99.95% STERILE WATER FOR IRRIGATION, USP AND APPLICATOR.: Brand: IRRISEPT ANTIMICROBIAL WOUND LAVAGE

## (undated) DEVICE — PK CATH CARD 10

## (undated) DEVICE — BALN NANOCROSS OTW .014 4F 3X150 150CM

## (undated) DEVICE — GLIDESHEATH SLENDER STAINLESS STEEL KIT: Brand: GLIDESHEATH SLENDER

## (undated) DEVICE — SUT VIC 2/0 CT2 27IN J269H

## (undated) DEVICE — RADIFOCUS GLIDECATH: Brand: GLIDECATH

## (undated) DEVICE — ST ACC MICROPUNCTURE .018 TRANSLSS/SS/TP 5F/10CM 21G/7CM

## (undated) DEVICE — CATH SUP PROC TRAILBLAZER .014IN 15MM 150CM

## (undated) DEVICE — SUT ETHLN 2/0 PS 18IN 585H

## (undated) DEVICE — ANTIBACTERIAL UNDYED BRAIDED (POLYGLACTIN 910), SYNTHETIC ABSORBABLE SUTURE: Brand: COATED VICRYL

## (undated) DEVICE — 1010 S-DRAPE TOWEL DRAPE 10/BX: Brand: STERI-DRAPE™

## (undated) DEVICE — ST EXT IV SMRTSTE 2VLV FIX M LL 6ML 41

## (undated) DEVICE — PUMP PAIN AUTOFUSER AUTO SELCT NOBOLUS 1TO14ML/HR 550ML DISP

## (undated) DEVICE — 3 BONE CEMENT MIXER: Brand: MIXEVAC

## (undated) DEVICE — CVR HNDL LIGHT RIGID

## (undated) DEVICE — ST INF PRI SMRTSTE 20DRP 2VLV 24ML 117

## (undated) DEVICE — GLV SURG SENSICARE PI MIC PF SZ7.5 LF STRL

## (undated) DEVICE — CATH SUP PROC TRAILBLAZER .035IN 135CM

## (undated) DEVICE — UNDERGLV SURG BIOGEL INDICAT PI SZ8 BLU

## (undated) DEVICE — INTENDED FOR TISSUE SEPARATION, AND OTHER PROCEDURES THAT REQUIRE A SHARP SURGICAL BLADE TO PUNCTURE OR CUT.: Brand: BARD-PARKER ® CARBON RIB-BACK BLADES

## (undated) DEVICE — RADIFOCUS GLIDEWIRE ADVANTAGE GUIDEWIRE: Brand: GLIDEWIRE ADVANTAGE